# Patient Record
Sex: FEMALE | Race: WHITE | NOT HISPANIC OR LATINO | Employment: FULL TIME | URBAN - METROPOLITAN AREA
[De-identification: names, ages, dates, MRNs, and addresses within clinical notes are randomized per-mention and may not be internally consistent; named-entity substitution may affect disease eponyms.]

---

## 2023-05-10 ENCOUNTER — TELEPHONE (OUTPATIENT)
Dept: PRIMARY CARE | Facility: CLINIC | Age: 43
End: 2023-05-10

## 2023-05-12 ENCOUNTER — OFFICE VISIT (OUTPATIENT)
Dept: PRIMARY CARE | Facility: CLINIC | Age: 43
End: 2023-05-12
Payer: COMMERCIAL

## 2023-05-12 VITALS
OXYGEN SATURATION: 99 % | TEMPERATURE: 96.6 F | BODY MASS INDEX: 24.53 KG/M2 | WEIGHT: 152 LBS | DIASTOLIC BLOOD PRESSURE: 72 MMHG | SYSTOLIC BLOOD PRESSURE: 120 MMHG | HEART RATE: 100 BPM

## 2023-05-12 DIAGNOSIS — J20.9 ACUTE BRONCHITIS, UNSPECIFIED: ICD-10-CM

## 2023-05-12 DIAGNOSIS — J45.20 MILD INTERMITTENT REACTIVE AIRWAY DISEASE WITHOUT COMPLICATION (HHS-HCC): Primary | ICD-10-CM

## 2023-05-12 PROBLEM — K59.09 CHRONIC CONSTIPATION: Status: ACTIVE | Noted: 2023-05-12

## 2023-05-12 PROBLEM — M25.519 SHOULDER PAIN: Status: ACTIVE | Noted: 2023-05-12

## 2023-05-12 PROBLEM — B37.31 VULVOVAGINAL CANDIDIASIS: Status: ACTIVE | Noted: 2023-05-12

## 2023-05-12 PROBLEM — F32.A DEPRESSION: Status: ACTIVE | Noted: 2023-05-12

## 2023-05-12 PROBLEM — R76.8 POSITIVE ANA (ANTINUCLEAR ANTIBODY): Status: ACTIVE | Noted: 2023-05-12

## 2023-05-12 PROBLEM — I20.89 ATYPICAL ANGINA (CMS-HCC): Status: ACTIVE | Noted: 2023-05-12

## 2023-05-12 PROBLEM — M25.50 ARTHRALGIA: Status: ACTIVE | Noted: 2023-05-12

## 2023-05-12 PROBLEM — M25.839 ULNOCARPAL IMPINGEMENT SYNDROME: Status: ACTIVE | Noted: 2023-05-12

## 2023-05-12 PROBLEM — R19.7 DIARRHEA: Status: ACTIVE | Noted: 2023-05-12

## 2023-05-12 PROBLEM — R55 NEAR SYNCOPE: Status: ACTIVE | Noted: 2023-05-12

## 2023-05-12 PROBLEM — R11.0 NAUSEA IN ADULT: Status: ACTIVE | Noted: 2023-05-12

## 2023-05-12 PROBLEM — F41.9 ANXIETY: Status: ACTIVE | Noted: 2023-05-12

## 2023-05-12 PROBLEM — M25.531 RIGHT WRIST PAIN: Status: ACTIVE | Noted: 2023-05-12

## 2023-05-12 PROBLEM — J30.9 ALLERGIC RHINITIS: Status: ACTIVE | Noted: 2023-05-12

## 2023-05-12 PROBLEM — I73.00 RAYNAUD PHENOMENON: Status: ACTIVE | Noted: 2023-05-12

## 2023-05-12 PROBLEM — I95.1 ORTHOSTATIC HYPOTENSION: Status: ACTIVE | Noted: 2023-05-12

## 2023-05-12 PROBLEM — S63.599A TRIANGULAR FIBROCARTILAGE COMPLEX TEAR: Status: ACTIVE | Noted: 2023-05-12

## 2023-05-12 PROBLEM — M65.9 FCU (FLEXOR CARPI ULNARIS) TENOSYNOVITIS: Status: ACTIVE | Noted: 2023-05-12

## 2023-05-12 PROBLEM — J45.909 RAD (REACTIVE AIRWAY DISEASE) (HHS-HCC): Status: ACTIVE | Noted: 2023-05-12

## 2023-05-12 PROBLEM — M79.7 FIBROMYALGIA: Status: ACTIVE | Noted: 2023-05-12

## 2023-05-12 PROBLEM — M65.939 FCU (FLEXOR CARPI ULNARIS) TENOSYNOVITIS: Status: ACTIVE | Noted: 2023-05-12

## 2023-05-12 PROBLEM — U07.1 COVID-19: Status: ACTIVE | Noted: 2023-05-12

## 2023-05-12 PROBLEM — R30.0 DYSURIA: Status: ACTIVE | Noted: 2023-05-12

## 2023-05-12 PROBLEM — Z20.822 EXPOSURE TO COVID-19 VIRUS: Status: ACTIVE | Noted: 2023-05-12

## 2023-05-12 PROBLEM — R05.9 COUGH: Status: ACTIVE | Noted: 2023-05-12

## 2023-05-12 PROBLEM — Z98.890 S/P ARTHROSCOPY OF RIGHT SHOULDER: Status: ACTIVE | Noted: 2023-05-12

## 2023-05-12 PROBLEM — Z97.5 FAMILY PLANNING, IUD (INTRAUTERINE DEVICE) IN PLACE: Status: ACTIVE | Noted: 2023-05-12

## 2023-05-12 PROCEDURE — 1036F TOBACCO NON-USER: CPT | Performed by: INTERNAL MEDICINE

## 2023-05-12 PROCEDURE — 99214 OFFICE O/P EST MOD 30 MIN: CPT | Performed by: INTERNAL MEDICINE

## 2023-05-12 RX ORDER — ACETAMINOPHEN 500 MG
1 TABLET ORAL DAILY
COMMUNITY
End: 2023-05-12 | Stop reason: ALTCHOICE

## 2023-05-12 RX ORDER — SUMATRIPTAN 50 MG/1
50 TABLET, FILM COATED ORAL ONCE AS NEEDED
COMMUNITY
Start: 2019-08-29

## 2023-05-12 RX ORDER — ALBUTEROL SULFATE 0.83 MG/ML
2.5 SOLUTION RESPIRATORY (INHALATION) 4 TIMES DAILY PRN
Qty: 75 ML | Refills: 2 | Status: SHIPPED | OUTPATIENT
Start: 2023-05-12 | End: 2024-05-11

## 2023-05-12 RX ORDER — OMEPRAZOLE 20 MG/1
1 TABLET, DELAYED RELEASE ORAL DAILY
COMMUNITY

## 2023-05-12 RX ORDER — PLANT STANOL ESTER 450 MG
TABLET ORAL
COMMUNITY

## 2023-05-12 RX ORDER — ACETAMINOPHEN 500 MG
5000 TABLET ORAL
COMMUNITY
End: 2023-05-12 | Stop reason: ALTCHOICE

## 2023-05-12 RX ORDER — PROMETHAZINE HYDROCHLORIDE 25 MG/1
1 TABLET ORAL EVERY 6 HOURS PRN
COMMUNITY
Start: 2019-07-11 | End: 2023-07-31 | Stop reason: SDUPTHER

## 2023-05-12 RX ORDER — PREDNISONE 20 MG/1
TABLET ORAL
Qty: 18 TABLET | Refills: 0 | Status: SHIPPED | OUTPATIENT
Start: 2023-05-12 | End: 2023-05-21

## 2023-05-12 RX ORDER — CYCLOBENZAPRINE HCL 10 MG
10 TABLET ORAL 2 TIMES DAILY PRN
COMMUNITY
Start: 2021-02-10

## 2023-05-12 RX ORDER — FLUTICASONE PROPIONATE 50 MCG
2 SPRAY, SUSPENSION (ML) NASAL DAILY
COMMUNITY
Start: 2019-05-01

## 2023-05-12 RX ORDER — ALBUTEROL SULFATE 90 UG/1
2 AEROSOL, METERED RESPIRATORY (INHALATION) EVERY 4 HOURS PRN
COMMUNITY
Start: 2019-09-11

## 2023-05-12 RX ORDER — ALPRAZOLAM 0.5 MG/1
0.5 TABLET ORAL
COMMUNITY
Start: 2021-11-24

## 2023-05-12 RX ORDER — LEVONORGESTREL 52 MG/1
INTRAUTERINE DEVICE INTRAUTERINE
COMMUNITY
Start: 2021-11-24

## 2023-05-12 RX ORDER — CALCIUM CARBONATE 300MG(750)
TABLET,CHEWABLE ORAL
COMMUNITY
Start: 2021-02-10

## 2023-05-12 ASSESSMENT — LIFESTYLE VARIABLES: HOW OFTEN DO YOU HAVE A DRINK CONTAINING ALCOHOL: MONTHLY OR LESS

## 2023-05-12 NOTE — PROGRESS NOTES
Subjective    Stephon Smith is a 42 y.o. female who presents for Cough.  HPI    C/o cough, sob x 2 weeks  Having asthma attacks daily   Used nebulizer a couple times, helped better than albuterol inhaler     Needs albuterol solution     Went to urgent care on Sunday, prescribed zpak but told not to take unless she gets a fever.   Completed prednisone yesterday    Review of Systems   All other systems reviewed and are negative.        Objective     /72 (BP Location: Right arm, Patient Position: Sitting, BP Cuff Size: Adult)   Pulse 100   Temp 35.9 °C (96.6 °F)   Wt 68.9 kg (152 lb)   SpO2 99%   BMI 24.53 kg/m²    Physical Exam  Vitals reviewed.   Constitutional:       General: She is not in acute distress.     Appearance: Normal appearance.   Cardiovascular:      Rate and Rhythm: Normal rate and regular rhythm.      Pulses: Normal pulses.      Heart sounds: Normal heart sounds.   Pulmonary:      Effort: Pulmonary effort is normal.      Breath sounds: Normal breath sounds.      Comments: Rare wheeze  Abdominal:      Tenderness: There is no abdominal tenderness.   Musculoskeletal:         General: No swelling.   Skin:     General: Skin is warm and dry.   Neurological:      Mental Status: She is alert.       Health Maintenance Due   Topic Date Due    Yearly Adult Physical  Never done    Lipid Panel  Never done    HIV Screening  Never done    COVID-19 Vaccine (1) Never done    Pneumococcal Vaccine: Pediatrics (0 to 5 Years) and At-Risk Patients (6 to 64 Years) (1 - PCV) Never done    Varicella Vaccines (1 of 2 - 2-dose childhood series) Never done    Hepatitis C Screening  Never done    Diabetes Screening  Never done    Cervical Cancer Screening  Never done          Assessment/Plan   Problem List Items Addressed This Visit          Respiratory    RAD (reactive airway disease) - Primary    Relevant Medications    albuterol 2.5 mg /3 mL (0.083 %) nebulizer solution    predniSONE (Deltasone) 20 mg tablet     Other Relevant Orders    XR chest 2 views   Start the azithromyacin when she gets home. She has it at home.  Increase fluids and rest. Call if sx worse or not improving. Follow up as needed  Check cxr.

## 2023-07-31 ENCOUNTER — OFFICE VISIT (OUTPATIENT)
Dept: PRIMARY CARE | Facility: CLINIC | Age: 43
End: 2023-07-31
Payer: COMMERCIAL

## 2023-07-31 VITALS
DIASTOLIC BLOOD PRESSURE: 68 MMHG | HEART RATE: 100 BPM | RESPIRATION RATE: 14 BRPM | BODY MASS INDEX: 23.86 KG/M2 | OXYGEN SATURATION: 99 % | TEMPERATURE: 97.7 F | HEIGHT: 67 IN | SYSTOLIC BLOOD PRESSURE: 120 MMHG | WEIGHT: 152 LBS

## 2023-07-31 DIAGNOSIS — R10.10 PAIN OF UPPER ABDOMEN: Primary | ICD-10-CM

## 2023-07-31 DIAGNOSIS — R11.0 NAUSEA IN ADULT: ICD-10-CM

## 2023-07-31 LAB — PREGNANCY TEST URINE, POC: NEGATIVE

## 2023-07-31 PROCEDURE — 81025 URINE PREGNANCY TEST: CPT | Performed by: INTERNAL MEDICINE

## 2023-07-31 PROCEDURE — 99214 OFFICE O/P EST MOD 30 MIN: CPT | Performed by: INTERNAL MEDICINE

## 2023-07-31 PROCEDURE — 1036F TOBACCO NON-USER: CPT | Performed by: INTERNAL MEDICINE

## 2023-07-31 RX ORDER — AMOXICILLIN AND CLAVULANATE POTASSIUM 875; 125 MG/1; MG/1
1 TABLET, FILM COATED ORAL
Qty: 20 TABLET | Status: CANCELLED
Start: 2023-07-31 | End: 2023-08-10

## 2023-07-31 RX ORDER — PROMETHAZINE HYDROCHLORIDE 25 MG/1
25 TABLET ORAL EVERY 6 HOURS PRN
Qty: 30 TABLET | Refills: 3 | Status: SHIPPED | OUTPATIENT
Start: 2023-07-31

## 2023-07-31 NOTE — PROGRESS NOTES
"Subjective    Stephon Smith is a 43 y.o. female who presents for Nausea.  HPI  C/o abd pain/cramping, bloating, nausea/vomiting, diarrhea. Intermittent over the last 3 weeks.   Hands feel clammy, headaches   For past three weeks. She had nausea, dairrhea, vomiting.  Then it resolved. Then restarted.   It does not matter if she eats  or not   She has had diarrhea  three times.   She has abd pain and bloating.   No recent abx.      Review of Systems   All other systems reviewed and are negative.        Objective     /68 (BP Location: Left arm, Patient Position: Sitting, BP Cuff Size: Adult)   Pulse 100   Temp 36.5 °C (97.7 °F) (Skin)   Resp 14   Ht 1.702 m (5' 7\")   Wt 68.9 kg (152 lb)   SpO2 99%   BMI 23.81 kg/m²    Physical Exam  Vitals reviewed.   Constitutional:       General: She is not in acute distress.     Appearance: Normal appearance.   Cardiovascular:      Rate and Rhythm: Normal rate and regular rhythm.      Pulses: Normal pulses.      Heart sounds: Normal heart sounds.   Pulmonary:      Effort: Pulmonary effort is normal.      Breath sounds: Normal breath sounds.   Abdominal:      General: Abdomen is flat. Bowel sounds are normal.      Palpations: Abdomen is soft.      Tenderness: There is abdominal tenderness.      Comments: Mild epigastric tenderness   Musculoskeletal:         General: No swelling.   Skin:     General: Skin is warm and dry.   Neurological:      Mental Status: She is alert.       Health Maintenance Due   Topic Date Due    Yearly Adult Physical  Never done    Lipid Panel  Never done    HIV Screening  Never done    COVID-19 Vaccine (1) Never done    Pneumococcal Vaccine: Pediatrics (0 to 5 Years) and At-Risk Patients (6 to 64 Years) (1 - PCV) Never done    Varicella Vaccines (1 of 2 - 2-dose childhood series) Never done    Hepatitis C Screening  Never done    Diabetes Screening  Never done    Cervical Cancer Screening  Never done          Assessment/Plan   Problem List " Items Addressed This Visit       Nausea in adult    Relevant Medications    promethazine (Phenergan) 25 mg tablet    Other Relevant Orders    POCT pregnancy, urine manually resulted (Completed)     Other Visit Diagnoses       Pain of upper abdomen    -  Primary    Relevant Orders    CBC    Comprehensive Metabolic Panel    Lipase    US gallbladder        Check labs, us  Increase her omeprazole to 40 a day  Watch diet   If us is neg will check hida. If sx worsen refer  to gi

## 2023-08-04 ENCOUNTER — LAB (OUTPATIENT)
Dept: LAB | Facility: LAB | Age: 43
End: 2023-08-04
Payer: COMMERCIAL

## 2023-08-04 DIAGNOSIS — R10.10 PAIN OF UPPER ABDOMEN: ICD-10-CM

## 2023-08-04 LAB
ERYTHROCYTE DISTRIBUTION WIDTH (RATIO) BY AUTOMATED COUNT: 12.3 % (ref 11.5–14.5)
ERYTHROCYTE MEAN CORPUSCULAR HEMOGLOBIN CONCENTRATION (G/DL) BY AUTOMATED: 32.9 G/DL (ref 32–36)
ERYTHROCYTE MEAN CORPUSCULAR VOLUME (FL) BY AUTOMATED COUNT: 90 FL (ref 80–100)
ERYTHROCYTES (10*6/UL) IN BLOOD BY AUTOMATED COUNT: 4.62 X10E12/L (ref 4–5.2)
HEMATOCRIT (%) IN BLOOD BY AUTOMATED COUNT: 41.4 % (ref 36–46)
HEMOGLOBIN (G/DL) IN BLOOD: 13.6 G/DL (ref 12–16)
LEUKOCYTES (10*3/UL) IN BLOOD BY AUTOMATED COUNT: 6.4 X10E9/L (ref 4.4–11.3)
PLATELETS (10*3/UL) IN BLOOD AUTOMATED COUNT: 287 X10E9/L (ref 150–450)

## 2023-08-04 PROCEDURE — 36415 COLL VENOUS BLD VENIPUNCTURE: CPT

## 2023-08-04 PROCEDURE — 83690 ASSAY OF LIPASE: CPT

## 2023-08-04 PROCEDURE — 80053 COMPREHEN METABOLIC PANEL: CPT

## 2023-08-04 PROCEDURE — 85027 COMPLETE CBC AUTOMATED: CPT

## 2023-08-05 LAB
ALANINE AMINOTRANSFERASE (SGPT) (U/L) IN SER/PLAS: 16 U/L (ref 7–45)
ALBUMIN (G/DL) IN SER/PLAS: 4.5 G/DL (ref 3.4–5)
ALKALINE PHOSPHATASE (U/L) IN SER/PLAS: 41 U/L (ref 33–110)
ANION GAP IN SER/PLAS: 12 MMOL/L (ref 10–20)
ASPARTATE AMINOTRANSFERASE (SGOT) (U/L) IN SER/PLAS: 15 U/L (ref 9–39)
BILIRUBIN TOTAL (MG/DL) IN SER/PLAS: 0.8 MG/DL (ref 0–1.2)
CALCIUM (MG/DL) IN SER/PLAS: 9.4 MG/DL (ref 8.6–10.3)
CARBON DIOXIDE, TOTAL (MMOL/L) IN SER/PLAS: 28 MMOL/L (ref 21–32)
CHLORIDE (MMOL/L) IN SER/PLAS: 105 MMOL/L (ref 98–107)
CREATININE (MG/DL) IN SER/PLAS: 0.77 MG/DL (ref 0.5–1.05)
GFR FEMALE: >90 ML/MIN/1.73M2
GLUCOSE (MG/DL) IN SER/PLAS: 73 MG/DL (ref 74–99)
LIPASE (U/L) IN SER/PLAS: 48 U/L (ref 9–82)
POTASSIUM (MMOL/L) IN SER/PLAS: 4.3 MMOL/L (ref 3.5–5.3)
PROTEIN TOTAL: 6.4 G/DL (ref 6.4–8.2)
SODIUM (MMOL/L) IN SER/PLAS: 141 MMOL/L (ref 136–145)
UREA NITROGEN (MG/DL) IN SER/PLAS: 15 MG/DL (ref 6–23)

## 2023-08-08 DIAGNOSIS — R10.10 PAIN OF UPPER ABDOMEN: Primary | ICD-10-CM

## 2023-08-08 DIAGNOSIS — R11.0 NAUSEA IN ADULT: ICD-10-CM

## 2023-08-08 NOTE — RESULT ENCOUNTER NOTE
Her us is negative. Small cysts in liver.   I will order a hida with gb ef please help her set  it up

## 2023-08-09 ENCOUNTER — TELEPHONE (OUTPATIENT)
Dept: PRIMARY CARE | Facility: CLINIC | Age: 43
End: 2023-08-09
Payer: COMMERCIAL

## 2023-09-07 ENCOUNTER — TELEPHONE (OUTPATIENT)
Dept: PRIMARY CARE | Facility: CLINIC | Age: 43
End: 2023-09-07
Payer: COMMERCIAL

## 2023-09-07 DIAGNOSIS — R10.10 PAIN OF UPPER ABDOMEN: ICD-10-CM

## 2023-09-07 DIAGNOSIS — R11.0 NAUSEA IN ADULT: ICD-10-CM

## 2023-09-07 NOTE — TELEPHONE ENCOUNTER
Marga Johnson, DO  Fatma Olvera, CMA  Her hida is  negative/normal function  Would recommend she see gastro. Please put a referral in and help her set  itup

## 2023-09-07 NOTE — RESULT ENCOUNTER NOTE
Her hida is  negative/normal function  Would recommend she see gastro. Please put a referral in and help her set  itup

## 2023-10-15 NOTE — PROGRESS NOTES
History of Present Illness:   Stephon Smith is a 43 y.o. female with a PMH of anxiety who presents to clinic for abdominal pain, nausea, and altered Bms (mostly constipation).  Patient states that her biggest complaint is constipation and nausea.  Constipation has been most of her life.  Although, she did have diarrhea for about a month, last month.  She has tried MiraLAX and Metamucil in the past.  Shredded wheat or raisin bran seem to be the only thing that helps.  She will have a bowel movement almost daily.  However, she can go multiple days without a bowel movement.  They are almost always small rocklike in consistency.  The abdominal is throughout the abdomen.  It is worse in the epigastric area.  + Bloating.  Her PCP has tried Phenergan and PPI for her symptoms.  PPI has not really helped.  She eats a healthy diet.  She does not consume pop.  No tobacco use.  She does admit to significant life stressors.  She has issues with anxiety.  Colonoscopy ~4 years ago - WNL.     RUQ US showed small liver cysts. Normal Hida scan. Normal LFTs.     Review of Systems  ROS Negative unless otherwise stated above.    Past Medical/Surgical History  Past Medical History:   Diagnosis Date    Superior glenoid labrum lesion of unspecified shoulder, initial encounter     Superior labrum anterior-to-posterior (SLAP) tear of shoulder    Unspecified lump in the right breast, unspecified quadrant 12/03/2013    Mass of right breast      Past Surgical History:   Procedure Laterality Date    DILATION AND CURETTAGE OF UTERUS  12/03/2013    Dilation And Curettage        Social History   reports that she has never smoked. She has never used smokeless tobacco. She reports current alcohol use. She reports that she does not use drugs.     Family History  family history is not on file.     Allergies  Allergies   Allergen Reactions    Azithromycin Hives     itchy pimples on chest and back after finishing course of abx    Bupropion Hives     Cefdinir Hives     dizziness    Erythromycin Hives     itchy pimples on chest and back after finishing course of abx    Topiramate Other     confusion/altered mental state    Doxycycline Hives and Rash    Latex Hives, Itching and Rash    Minocycline Hives and Rash    Penicillins Hives and Rash     itchy pimples on chest and back after finishing course of abx       Medications  Current Outpatient Medications   Medication Instructions    albuterol 90 mcg/actuation inhaler 2 puffs, inhalation, Every 4 hours PRN    albuterol 2.5 mg, nebulization, 4 times daily PRN    ALPRAZolam (XANAX) 0.5 mg, oral    cyclobenzaprine (FLEXERIL) 10 mg, oral, 2 times daily PRN    fluticasone (Flonase) 50 mcg/actuation nasal spray 2 sprays, nasal, Daily    levonorgestrel (Mirena) 21 mcg/24 hours (8 yrs) 52 mg IUD intrauterine    magnesium oxide (Mag-Ox) 400 mg tablet oral    omeprazole OTC (PriLOSEC OTC) 20 mg EC tablet 1 tablet, oral, Daily    potassium gluconate 550 mg (90 mg) tablet oral    promethazine (PHENERGAN) 25 mg, oral, Every 6 hours PRN    SUMAtriptan (IMITREX) 50 mg, oral, Once as needed        Objective   Visit Vitals  /83   Pulse 106   Resp 18        General: A&Ox3, NAD.  HEENT: AT/NC.   CV: Tachycardic.   Resp: CTA bilaterally. No wheezing, rhonchi or rales.   GI: Soft, NT/ND.   Extrem: No edema. Pulses intact.  Skin: No Jaundice.   Neuro: No focal deficits.   Psych: Normal mood and affect.     Assessment/Plan   Stephon Smith is a 43 y.o. female with a PMH of FM, depression/anxiety who presents to clinic for abdominal pain, nausea, and altered BMs (mostly constipation).    Suspect patient's symptoms are functional/IBS vs chronic constipation vs other.  As the constipation seems to be the bigger issue and can definitely lead to bloating, nausea, etc., we will focus on that and start with a bowel prep clean out and Linzess 290 mcg QD.  MiraLAX and Metamucil did not work in the past (although these were not titrated).  RUQ US showed small liver cysts. Normal Hida scan. Normal LFTs. Patient must reduce stress/anxiety. Advised to exercise/walk daily, hobbies, yoga, etc. Consider endoscopic evaluation. Consider TCA. Would hold off on antiemetics, as they will likely worsen constipation.       Jerson Agarwal, DO

## 2023-10-16 ENCOUNTER — OFFICE VISIT (OUTPATIENT)
Dept: GASTROENTEROLOGY | Facility: CLINIC | Age: 43
End: 2023-10-16
Payer: COMMERCIAL

## 2023-10-16 ENCOUNTER — LAB (OUTPATIENT)
Dept: LAB | Facility: LAB | Age: 43
End: 2023-10-16
Payer: COMMERCIAL

## 2023-10-16 VITALS
HEIGHT: 66 IN | HEART RATE: 106 BPM | OXYGEN SATURATION: 98 % | BODY MASS INDEX: 25.07 KG/M2 | WEIGHT: 156 LBS | DIASTOLIC BLOOD PRESSURE: 83 MMHG | RESPIRATION RATE: 18 BRPM | SYSTOLIC BLOOD PRESSURE: 122 MMHG

## 2023-10-16 DIAGNOSIS — K58.1 IRRITABLE BOWEL SYNDROME WITH CONSTIPATION: Primary | ICD-10-CM

## 2023-10-16 DIAGNOSIS — R10.10 PAIN OF UPPER ABDOMEN: ICD-10-CM

## 2023-10-16 DIAGNOSIS — R11.0 NAUSEA IN ADULT: ICD-10-CM

## 2023-10-16 DIAGNOSIS — K59.09 CHRONIC CONSTIPATION: ICD-10-CM

## 2023-10-16 LAB — CRP SERPL-MCNC: <0.1 MG/DL

## 2023-10-16 PROCEDURE — 1036F TOBACCO NON-USER: CPT | Performed by: STUDENT IN AN ORGANIZED HEALTH CARE EDUCATION/TRAINING PROGRAM

## 2023-10-16 PROCEDURE — 83516 IMMUNOASSAY NONANTIBODY: CPT

## 2023-10-16 PROCEDURE — 82784 ASSAY IGA/IGD/IGG/IGM EACH: CPT

## 2023-10-16 PROCEDURE — 36415 COLL VENOUS BLD VENIPUNCTURE: CPT

## 2023-10-16 PROCEDURE — 99204 OFFICE O/P NEW MOD 45 MIN: CPT | Performed by: STUDENT IN AN ORGANIZED HEALTH CARE EDUCATION/TRAINING PROGRAM

## 2023-10-16 PROCEDURE — 86140 C-REACTIVE PROTEIN: CPT

## 2023-10-17 LAB
IGA SERPL-MCNC: 171 MG/DL (ref 70–400)
TTG IGA SER IA-ACNC: <1 U/ML

## 2023-10-19 ENCOUNTER — OFFICE VISIT (OUTPATIENT)
Dept: PRIMARY CARE | Facility: CLINIC | Age: 43
End: 2023-10-19
Payer: COMMERCIAL

## 2023-10-19 VITALS
TEMPERATURE: 98 F | WEIGHT: 157 LBS | HEART RATE: 94 BPM | BODY MASS INDEX: 25.34 KG/M2 | DIASTOLIC BLOOD PRESSURE: 86 MMHG | RESPIRATION RATE: 16 BRPM | SYSTOLIC BLOOD PRESSURE: 120 MMHG

## 2023-10-19 DIAGNOSIS — B35.4 RINGWORM, BODY: Primary | ICD-10-CM

## 2023-10-19 PROCEDURE — 1036F TOBACCO NON-USER: CPT | Performed by: NURSE PRACTITIONER

## 2023-10-19 PROCEDURE — 99213 OFFICE O/P EST LOW 20 MIN: CPT | Performed by: NURSE PRACTITIONER

## 2023-10-19 RX ORDER — CLOTRIMAZOLE AND BETAMETHASONE DIPROPIONATE 10; .64 MG/G; MG/G
1 CREAM TOPICAL 2 TIMES DAILY
Qty: 45 G | Refills: 1 | Status: SHIPPED | OUTPATIENT
Start: 2023-10-19 | End: 2023-12-18

## 2023-10-19 RX ORDER — METHYLPREDNISOLONE 4 MG/1
TABLET ORAL
Qty: 21 TABLET | Refills: 0 | Status: SHIPPED | OUTPATIENT
Start: 2023-10-19 | End: 2023-10-26

## 2023-10-19 ASSESSMENT — ENCOUNTER SYMPTOMS
FEVER: 0
EYE PAIN: 0
VOMITING: 0
SORE THROAT: 0
DIARRHEA: 0
FATIGUE: 0
SHORTNESS OF BREATH: 0
COUGH: 0

## 2023-10-19 NOTE — PROGRESS NOTES
Subjective   Patient ID: Stephon Smith is a 43 y.o. female who presents for Rash. She had a rash for 2 weeks, treating it like ringworm with lamisil and fungal ointment. The rash has spread over her breast and all over her chest. It is painful at times. Her dog had a similar rash, but it went away.    Rash  This is a new problem. The current episode started 1 to 4 weeks ago. The problem is unchanged. The affected locations include the chest, abdomen and torso. The rash is characterized by itchiness, redness and swelling. She was exposed to nothing. Pertinent negatives include no congestion, cough, diarrhea, eye pain, facial edema, fatigue, fever, shortness of breath, sore throat or vomiting. Treatments tried: Lamisil. The treatment provided no relief.        Review of Systems   Constitutional:  Negative for fatigue and fever.   HENT:  Negative for congestion and sore throat.    Eyes:  Negative for pain.   Respiratory:  Negative for cough and shortness of breath.    Gastrointestinal:  Negative for diarrhea and vomiting.   Skin:  Positive for rash.       Objective   /86 (BP Location: Left arm, Patient Position: Sitting, BP Cuff Size: Adult)   Pulse 94   Temp 36.7 °C (98 °F) (Temporal)   Resp 16   Wt 71.2 kg (157 lb)   BMI 25.34 kg/m²     Physical Exam  Constitutional:       Appearance: Normal appearance.   Cardiovascular:      Rate and Rhythm: Normal rate and regular rhythm.      Pulses: Normal pulses.      Heart sounds: Normal heart sounds.   Pulmonary:      Effort: Pulmonary effort is normal.      Breath sounds: Normal breath sounds.   Abdominal:      General: Bowel sounds are normal.      Palpations: Abdomen is soft.   Musculoskeletal:         General: Normal range of motion.   Skin:     General: Skin is warm.      Findings: Rash present. Rash is scaling (patches on chest, abdomen, and back).   Neurological:      Mental Status: She is alert.   Psychiatric:         Mood and Affect: Mood normal.          Behavior: Behavior normal.         Assessment/Plan   Problem List Items Addressed This Visit    None  Visit Diagnoses       Ringworm, body    -  Primary    Relevant Medications    clotrimazole-betamethasone (Lotrisone) cream    methylPREDNISolone (Medrol Dospak) 4 mg tablets          Patient Instructions   Patient to start taking medrol dose wilfrido and topical cream as ordered. Call the office if no improvement by Monday. Follow-up with PCP in 1-2 weeks as needed.

## 2023-10-23 NOTE — PATIENT INSTRUCTIONS
Patient to start taking medrol dose wilfrido and topical cream as ordered. Call the office if no improvement by Monday. Follow-up with PCP in 1-2 weeks as needed.

## 2023-11-17 ENCOUNTER — OFFICE VISIT (OUTPATIENT)
Dept: PRIMARY CARE | Facility: CLINIC | Age: 43
End: 2023-11-17
Payer: COMMERCIAL

## 2023-11-17 VITALS
WEIGHT: 161 LBS | OXYGEN SATURATION: 98 % | HEART RATE: 73 BPM | RESPIRATION RATE: 18 BRPM | TEMPERATURE: 98.4 F | DIASTOLIC BLOOD PRESSURE: 80 MMHG | SYSTOLIC BLOOD PRESSURE: 126 MMHG | BODY MASS INDEX: 25.99 KG/M2

## 2023-11-17 DIAGNOSIS — J06.9 UPPER RESPIRATORY TRACT INFECTION, UNSPECIFIED TYPE: Primary | ICD-10-CM

## 2023-11-17 DIAGNOSIS — Z86.19 FREQUENT INFECTIONS: ICD-10-CM

## 2023-11-17 PROCEDURE — 99214 OFFICE O/P EST MOD 30 MIN: CPT | Performed by: NURSE PRACTITIONER

## 2023-11-17 PROCEDURE — 87636 SARSCOV2 & INF A&B AMP PRB: CPT

## 2023-11-17 PROCEDURE — 1036F TOBACCO NON-USER: CPT | Performed by: NURSE PRACTITIONER

## 2023-11-17 RX ORDER — AZITHROMYCIN 250 MG/1
TABLET, FILM COATED ORAL
Qty: 6 TABLET | Refills: 0 | Status: SHIPPED | OUTPATIENT
Start: 2023-11-17 | End: 2023-11-22

## 2023-11-17 RX ORDER — PREDNISONE 20 MG/1
TABLET ORAL
Qty: 18 TABLET | Refills: 0 | Status: SHIPPED | OUTPATIENT
Start: 2023-11-17

## 2023-11-17 ASSESSMENT — ENCOUNTER SYMPTOMS
DIARRHEA: 1
FATIGUE: 1
HEADACHES: 1
SORE THROAT: 1
WHEEZING: 1
NAUSEA: 0
ABDOMINAL PAIN: 0
CHILLS: 1
MYALGIAS: 1
VOMITING: 0
COUGH: 1
SHORTNESS OF BREATH: 1
SINUS PAIN: 1
APPETITE CHANGE: 1
FEVER: 1
SINUS PRESSURE: 1
RHINORRHEA: 1

## 2023-11-17 NOTE — PROGRESS NOTES
Patient says that for the past three weeks she has had on and off sinus congestion, headaches, cough. Pt has been coughing up yellow/green drainage. Pt has had on and off fevers, muscle aches and chills. Pt has left ear pain. Patient is not vaccinated against COVID. She says that she tested for COVID on Wednesday and it was negative. Patient has been using OTC sudafed, mucous relief and advil. Pt has been using nebulizer. Her lungs hurt. She says that her lungs burn.      Review of Systems   Constitutional:  Positive for appetite change, chills, fatigue and fever (last one Tuesday, felt warm 99.8 after advil).   HENT:  Positive for congestion, ear pain, rhinorrhea, sinus pressure, sinus pain, sneezing and sore throat.    Respiratory:  Positive for cough, shortness of breath (tuesday and wednesday) and wheezing (tuesday and wednesday).    Gastrointestinal:  Positive for diarrhea. Negative for abdominal pain, nausea and vomiting.   Musculoskeletal:  Positive for myalgias.   Neurological:  Positive for headaches.     Objective   /80   Pulse 73   Temp 36.9 °C (98.4 °F)   Resp 18   Wt 73 kg (161 lb)   SpO2 98%   BMI 25.99 kg/m²     Physical Exam  Vitals reviewed.   Constitutional:       General: She is not in acute distress.     Appearance: Normal appearance. She is not ill-appearing or toxic-appearing.   HENT:      Head: Atraumatic.      Right Ear: Tympanic membrane, ear canal and external ear normal.      Left Ear: Tympanic membrane, ear canal and external ear normal.      Nose: Congestion and rhinorrhea present.      Right Sinus: Maxillary sinus tenderness and frontal sinus tenderness present.      Left Sinus: Maxillary sinus tenderness and frontal sinus tenderness present.      Mouth/Throat:      Mouth: Mucous membranes are moist.      Pharynx: Oropharynx is clear. No oropharyngeal exudate or posterior oropharyngeal erythema.   Eyes:      Conjunctiva/sclera: Conjunctivae normal.   Cardiovascular:       Rate and Rhythm: Normal rate and regular rhythm.      Heart sounds: Normal heart sounds. No murmur heard.  Pulmonary:      Effort: Pulmonary effort is normal.      Breath sounds: Normal breath sounds. No wheezing.   Skin:     General: Skin is warm and dry.   Neurological:      General: No focal deficit present.      Mental Status: She is alert.     Assessment/Plan   Problem List Items Addressed This Visit    None  Visit Diagnoses         Codes    Upper respiratory tract infection, unspecified type    -  Primary J06.9    Relevant Medications    azithromycin (Zithromax) 250 mg tablet    predniSONE (Deltasone) 20 mg tablet    Other Relevant Orders    Sars-CoV-2 and Influenza A/B PCR    XR chest 2 views    Frequent infections     Z86.19    Relevant Orders    Referral to Allergy        Will get COVID testing. Patient with ongoing URI symptoms. She has several allergies listed but she has tolerated azithromycin in the past. Will start pt on prednisone taper. Reminded pt to avoid other anti-inflammatories while on the steroid; she agreed. Pt has had ongoing cough so chest xray ordered as well. Advised patient on use of humidifier and hot steam treatments. Discussed that patient is to drink plenty of fluids and stay well hydrated. Can take tylenol as needed for any fevers or discomfort. Patient can use mucinex and flonase as well. Discussed that patient is to go to the ER for any chest pain, difficulty breathing, shortness of breath or new/concerning symptoms; she agreed. Will call pt when results become available. Pt reminded to self quarantine; she agreed. Pt to follow up in 2-3 days if no better despite the use of the medications.     Of note, pt has had ongoing URI symptoms. Will refer to allergy/immunology for further workup. She had a workup through the [facility] several years ago but would like re-evaluation.

## 2023-11-18 LAB
FLUAV RNA RESP QL NAA+PROBE: NOT DETECTED
FLUBV RNA RESP QL NAA+PROBE: NOT DETECTED
SARS-COV-2 RNA RESP QL NAA+PROBE: DETECTED

## 2023-11-18 NOTE — RESULT ENCOUNTER NOTE
Spoke to patient this morning and relayed positive COVID test results. Patient says that last night she started taking the medicines and today she is feeling slightly better. She says that her most recent URI symptoms started on Tuesday with body aches. I discussed with her the use of antivirals for COVID and she says that she is likely close to being outside of the treatment window for COVId and she will stay on her current treatment. I advised that patient is to go to the ER for any difficulty breathing, shortness of breath, chest pain or new/concerning symptoms; she agreed. PT to follow up as needed.

## 2023-11-19 ENCOUNTER — TELEPHONE (OUTPATIENT)
Dept: PRIMARY CARE | Facility: CLINIC | Age: 43
End: 2023-11-19
Payer: COMMERCIAL

## 2023-11-19 NOTE — TELEPHONE ENCOUNTER
----- Message from DAYTON Solorzano sent at 11/18/2023 10:37 AM EST -----  Spoke to patient this morning and relayed positive COVID test results. Patient says that last night she started taking the medicines and today she is feeling slightly better. She says that her most recent URI symptoms started on Tuesday with body aches. I discussed with her the use of antivirals for COVID and she says that she is likely close to being outside of the treatment window for COVId and she will stay on her current treatment. I advised that patient is to go to the ER for any difficulty breathing, shortness of breath, chest pain or new/concerning symptoms; she agreed. PT to follow up as needed.

## 2023-12-11 ENCOUNTER — APPOINTMENT (OUTPATIENT)
Dept: GASTROENTEROLOGY | Facility: CLINIC | Age: 43
End: 2023-12-11
Payer: COMMERCIAL

## 2024-06-13 DIAGNOSIS — M79.7 FIBROMYALGIA: ICD-10-CM

## 2024-06-13 RX ORDER — MILNACIPRAN HYDROCHLORIDE 50 MG/1
TABLET, FILM COATED ORAL 2 TIMES DAILY
Qty: 180 TABLET | Refills: 3 | Status: SHIPPED | OUTPATIENT
Start: 2024-06-13

## 2024-07-09 ENCOUNTER — TELEPHONE (OUTPATIENT)
Dept: SCHEDULING | Age: 44
End: 2024-07-09

## 2024-07-09 ENCOUNTER — APPOINTMENT (OUTPATIENT)
Dept: PRIMARY CARE | Facility: CLINIC | Age: 44
End: 2024-07-09
Payer: COMMERCIAL

## 2024-07-09 VITALS
BODY MASS INDEX: 25.23 KG/M2 | OXYGEN SATURATION: 100 % | SYSTOLIC BLOOD PRESSURE: 120 MMHG | RESPIRATION RATE: 14 BRPM | WEIGHT: 157 LBS | HEIGHT: 66 IN | TEMPERATURE: 98.2 F | HEART RATE: 97 BPM | DIASTOLIC BLOOD PRESSURE: 88 MMHG

## 2024-07-09 DIAGNOSIS — Z12.31 ENCOUNTER FOR SCREENING MAMMOGRAM FOR MALIGNANT NEOPLASM OF BREAST: ICD-10-CM

## 2024-07-09 DIAGNOSIS — R92.2 DENSE BREAST: ICD-10-CM

## 2024-07-09 DIAGNOSIS — M79.7 FIBROMYALGIA: ICD-10-CM

## 2024-07-09 DIAGNOSIS — R92.30 DENSE BREAST: ICD-10-CM

## 2024-07-09 DIAGNOSIS — J45.20 MILD INTERMITTENT ASTHMA, UNSPECIFIED WHETHER COMPLICATED (HHS-HCC): ICD-10-CM

## 2024-07-09 DIAGNOSIS — Z00.00 WELLNESS EXAMINATION: Primary | ICD-10-CM

## 2024-07-09 DIAGNOSIS — E01.0 THYROMEGALY: ICD-10-CM

## 2024-07-09 DIAGNOSIS — F41.9 ANXIETY: ICD-10-CM

## 2024-07-09 PROBLEM — U07.1 COVID-19: Status: RESOLVED | Noted: 2023-05-12 | Resolved: 2024-07-09

## 2024-07-09 PROBLEM — R55 NEAR SYNCOPE: Status: RESOLVED | Noted: 2023-05-12 | Resolved: 2024-07-09

## 2024-07-09 PROBLEM — J20.9 BRONCHOSPASM WITH BRONCHITIS, ACUTE: Status: RESOLVED | Noted: 2023-05-12 | Resolved: 2024-07-09

## 2024-07-09 PROBLEM — R05.9 COUGH: Status: RESOLVED | Noted: 2023-05-12 | Resolved: 2024-07-09

## 2024-07-09 PROBLEM — Z98.890 S/P ARTHROSCOPY OF RIGHT SHOULDER: Status: RESOLVED | Noted: 2023-05-12 | Resolved: 2024-07-09

## 2024-07-09 PROBLEM — B37.31 VULVOVAGINAL CANDIDIASIS: Status: RESOLVED | Noted: 2023-05-12 | Resolved: 2024-07-09

## 2024-07-09 PROBLEM — Z20.822 EXPOSURE TO COVID-19 VIRUS: Status: RESOLVED | Noted: 2023-05-12 | Resolved: 2024-07-09

## 2024-07-09 PROBLEM — I20.89 ATYPICAL ANGINA (CMS-HCC): Status: RESOLVED | Noted: 2023-05-12 | Resolved: 2024-07-09

## 2024-07-09 PROBLEM — R30.0 DYSURIA: Status: RESOLVED | Noted: 2023-05-12 | Resolved: 2024-07-09

## 2024-07-09 PROBLEM — M25.531 RIGHT WRIST PAIN: Status: RESOLVED | Noted: 2023-05-12 | Resolved: 2024-07-09

## 2024-07-09 PROCEDURE — 1036F TOBACCO NON-USER: CPT | Performed by: INTERNAL MEDICINE

## 2024-07-09 PROCEDURE — 99396 PREV VISIT EST AGE 40-64: CPT | Performed by: INTERNAL MEDICINE

## 2024-07-09 RX ORDER — ALPRAZOLAM 0.5 MG/1
0.5 TABLET ORAL 3 TIMES DAILY PRN
Qty: 18 TABLET | Refills: 0 | Status: SHIPPED | OUTPATIENT
Start: 2024-07-09 | End: 2024-07-15

## 2024-07-09 ASSESSMENT — PATIENT HEALTH QUESTIONNAIRE - PHQ9
SUM OF ALL RESPONSES TO PHQ9 QUESTIONS 1 AND 2: 0
1. LITTLE INTEREST OR PLEASURE IN DOING THINGS: NOT AT ALL
2. FEELING DOWN, DEPRESSED OR HOPELESS: NOT AT ALL

## 2024-07-09 NOTE — PROGRESS NOTES
"Subjective    Stephon Smith is a 44 y.o. female who presents for Annual Exam.  HPI    GYN 2023 repeat in 2028  Mammogram 2022  Discuss breast MRI  Mom recently diagnosed with focal atypical ductal hyperplasia  breast carcinoma   Maternal great grandma and grandma both had breast cancer   She developed asthma after Covid. Uses albuterol. Only uses her albuterol rarely  Her fibro is bad.     Review of Systems   All other systems reviewed and are negative.        Objective     /88 (BP Location: Left arm, Patient Position: Sitting, BP Cuff Size: Adult)   Pulse 97   Temp 36.8 °C (98.2 °F) (Skin)   Resp 14   Ht 1.676 m (5' 6\")   Wt 71.2 kg (157 lb)   SpO2 100%   BMI 25.34 kg/m²    Physical Exam  Constitutional:       Appearance: Normal appearance.   Neck:      Thyroid: Thyromegaly present.   Cardiovascular:      Rate and Rhythm: Normal rate and regular rhythm.      Pulses: Normal pulses.   Pulmonary:      Effort: Pulmonary effort is normal.      Breath sounds: Normal breath sounds.   Chest:      Chest wall: No mass or tenderness.   Breasts:     Right: Normal.      Left: Normal.   Abdominal:      General: Abdomen is flat.   Musculoskeletal:      Cervical back: Neck supple. No tenderness.   Lymphadenopathy:      Cervical: No cervical adenopathy.      Upper Body:      Right upper body: No supraclavicular or axillary adenopathy.      Left upper body: No supraclavicular or axillary adenopathy.   Neurological:      Mental Status: She is alert.   Psychiatric:         Attention and Perception: Attention and perception normal.         Mood and Affect: Mood and affect normal.     Health Maintenance Due   Topic Date Due    Yearly Adult Physical  Never done    Lipid Panel  Never done    HIV Screening  Never done    COVID-19 Vaccine (1) Never done    Pneumococcal Vaccine: Pediatrics (0 to 5 Years) and At-Risk Patients (6 to 64 Years) (1 of 2 - PCV) Never done    Varicella Vaccines (1 of 2 - 13+ 2-dose series) Never " done    Hepatitis C Screening  Never done    Diabetes Screening  Never done    Hepatitis A Vaccines (1 of 2 - Risk 2-dose series) Never done    Mammogram  12/16/2023    DTaP/Tdap/Td Vaccines (3 - Td or Tdap) 01/03/2024          Assessment/Plan   Problem List Items Addressed This Visit       Anxiety    Relevant Medications    ALPRAZolam (Xanax) 0.5 mg tablet    Fibromyalgia    RAD (reactive airway disease) ([facility])     Other Visit Diagnoses       Wellness examination    -  Primary    Relevant Orders    CBC    Comprehensive Metabolic Panel    Lipid Panel    Encounter for screening mammogram for malignant neoplasm of breast        Relevant Orders    BI mammo bilateral screening tomosynthesis    Dense breast        Relevant Orders    MR breast bilateral w IV contrast fast screening self pay    Thyromegaly        Relevant Orders    US thyroid    Tsh With Reflex To Free T4 If Abnormal        Check labs.   Refill xanax.  Order mamm and mri of breast.  Her thyroid is enlarged. Check us  She would like to start lyrica. Will have her follow up for controlled substance appt.  Call  with any problems or questions.   Follow up as needed

## 2024-07-26 ENCOUNTER — HOSPITAL ENCOUNTER (OUTPATIENT)
Dept: RADIOLOGY | Facility: HOSPITAL | Age: 44
Discharge: HOME | End: 2024-07-26
Payer: COMMERCIAL

## 2024-07-26 VITALS — HEIGHT: 66 IN | WEIGHT: 150 LBS | BODY MASS INDEX: 24.11 KG/M2

## 2024-07-26 DIAGNOSIS — E01.0 THYROMEGALY: ICD-10-CM

## 2024-07-26 DIAGNOSIS — Z12.31 ENCOUNTER FOR SCREENING MAMMOGRAM FOR MALIGNANT NEOPLASM OF BREAST: ICD-10-CM

## 2024-07-26 PROCEDURE — 77067 SCR MAMMO BI INCL CAD: CPT

## 2024-07-26 PROCEDURE — 76536 US EXAM OF HEAD AND NECK: CPT

## 2024-07-30 DIAGNOSIS — E07.9 THYROID MASS: Primary | ICD-10-CM

## 2024-07-31 ENCOUNTER — LAB (OUTPATIENT)
Dept: LAB | Facility: LAB | Age: 44
End: 2024-07-31
Payer: COMMERCIAL

## 2024-07-31 DIAGNOSIS — E01.0 THYROMEGALY: ICD-10-CM

## 2024-07-31 DIAGNOSIS — Z00.00 WELLNESS EXAMINATION: ICD-10-CM

## 2024-07-31 LAB
ALBUMIN SERPL BCP-MCNC: 4.5 G/DL (ref 3.4–5)
ALP SERPL-CCNC: 46 U/L (ref 33–110)
ALT SERPL W P-5'-P-CCNC: 17 U/L (ref 7–45)
ANION GAP SERPL CALC-SCNC: 11 MMOL/L (ref 10–20)
AST SERPL W P-5'-P-CCNC: 15 U/L (ref 9–39)
BILIRUB SERPL-MCNC: 1.1 MG/DL (ref 0–1.2)
BUN SERPL-MCNC: 11 MG/DL (ref 6–23)
CALCIUM SERPL-MCNC: 9.4 MG/DL (ref 8.6–10.3)
CHLORIDE SERPL-SCNC: 103 MMOL/L (ref 98–107)
CHOLEST SERPL-MCNC: 162 MG/DL (ref 0–199)
CHOLESTEROL/HDL RATIO: 2.5
CO2 SERPL-SCNC: 30 MMOL/L (ref 21–32)
CREAT SERPL-MCNC: 0.82 MG/DL (ref 0.5–1.05)
EGFRCR SERPLBLD CKD-EPI 2021: >90 ML/MIN/1.73M*2
ERYTHROCYTE [DISTWIDTH] IN BLOOD BY AUTOMATED COUNT: 12.4 % (ref 11.5–14.5)
GLUCOSE SERPL-MCNC: 90 MG/DL (ref 74–99)
HCT VFR BLD AUTO: 43.5 % (ref 36–46)
HDLC SERPL-MCNC: 65.6 MG/DL
HGB BLD-MCNC: 14.1 G/DL (ref 12–16)
LDLC SERPL CALC-MCNC: 80 MG/DL
MCH RBC QN AUTO: 29.1 PG (ref 26–34)
MCHC RBC AUTO-ENTMCNC: 32.4 G/DL (ref 32–36)
MCV RBC AUTO: 90 FL (ref 80–100)
NON HDL CHOLESTEROL: 96 MG/DL (ref 0–149)
NRBC BLD-RTO: 0 /100 WBCS (ref 0–0)
PLATELET # BLD AUTO: 317 X10*3/UL (ref 150–450)
POTASSIUM SERPL-SCNC: 4.2 MMOL/L (ref 3.5–5.3)
PROT SERPL-MCNC: 6.7 G/DL (ref 6.4–8.2)
RBC # BLD AUTO: 4.84 X10*6/UL (ref 4–5.2)
SODIUM SERPL-SCNC: 140 MMOL/L (ref 136–145)
TRIGL SERPL-MCNC: 81 MG/DL (ref 0–149)
TSH SERPL-ACNC: 0.63 MIU/L (ref 0.44–3.98)
VLDL: 16 MG/DL (ref 0–40)
WBC # BLD AUTO: 6.5 X10*3/UL (ref 4.4–11.3)

## 2024-07-31 PROCEDURE — 36415 COLL VENOUS BLD VENIPUNCTURE: CPT

## 2024-07-31 PROCEDURE — 85027 COMPLETE CBC AUTOMATED: CPT

## 2024-07-31 PROCEDURE — 80061 LIPID PANEL: CPT

## 2024-07-31 PROCEDURE — 84443 ASSAY THYROID STIM HORMONE: CPT

## 2024-07-31 PROCEDURE — 80053 COMPREHEN METABOLIC PANEL: CPT

## 2024-08-29 ENCOUNTER — APPOINTMENT (OUTPATIENT)
Dept: PRIMARY CARE | Facility: CLINIC | Age: 44
End: 2024-08-29
Payer: COMMERCIAL

## 2024-08-29 VITALS
TEMPERATURE: 97.9 F | HEART RATE: 100 BPM | RESPIRATION RATE: 14 BRPM | HEIGHT: 66 IN | DIASTOLIC BLOOD PRESSURE: 68 MMHG | OXYGEN SATURATION: 98 % | SYSTOLIC BLOOD PRESSURE: 118 MMHG | BODY MASS INDEX: 25.88 KG/M2 | WEIGHT: 161 LBS

## 2024-08-29 DIAGNOSIS — M79.7 FIBROMYALGIA: ICD-10-CM

## 2024-08-29 DIAGNOSIS — M79.7 FIBROMYALGIA: Primary | ICD-10-CM

## 2024-08-29 DIAGNOSIS — E04.1 THYROID NODULE: ICD-10-CM

## 2024-08-29 PROCEDURE — 3008F BODY MASS INDEX DOCD: CPT | Performed by: INTERNAL MEDICINE

## 2024-08-29 PROCEDURE — 99214 OFFICE O/P EST MOD 30 MIN: CPT | Performed by: INTERNAL MEDICINE

## 2024-08-29 NOTE — PROGRESS NOTES
"Subjective    Stephon Smith is a 44 y.o. female who presents for Follow-up.  HPI    1 month fu   Discuss lyrica , concerned with side effects   She would like to wean off of Savella. She has been on for years. She would like to  See how she does off of it and then consider lyrica.      Review of Systems   All other systems reviewed and are negative.        Objective     /68 (BP Location: Left arm, Patient Position: Sitting, BP Cuff Size: Adult)   Pulse 100   Temp 36.6 °C (97.9 °F) (Skin)   Resp 14   Ht 1.676 m (5' 6\")   Wt 73 kg (161 lb)   SpO2 98%   BMI 25.99 kg/m²    Physical Exam  Vitals reviewed.   Constitutional:       General: She is not in acute distress.     Appearance: Normal appearance.   Cardiovascular:      Rate and Rhythm: Normal rate and regular rhythm.      Pulses: Normal pulses.      Heart sounds: Normal heart sounds.   Pulmonary:      Effort: Pulmonary effort is normal.      Breath sounds: Normal breath sounds.   Abdominal:      Tenderness: There is no abdominal tenderness.   Musculoskeletal:         General: No swelling.   Skin:     General: Skin is warm and dry.   Neurological:      Mental Status: She is alert.       Health Maintenance Due   Topic Date Due    HIV Screening  Never done    Pneumococcal Vaccine: Pediatrics (0 to 5 Years) and At-Risk Patients (6 to 64 Years) (1 of 2 - PCV) Never done    Varicella Vaccines (1 of 2 - 13+ 2-dose series) Never done    Hepatitis C Screening  Never done    Diabetes Screening  Never done    COVID-19 Vaccine (1 - 2023-24 season) Never done    DTaP/Tdap/Td Vaccines (3 - Td or Tdap) 01/03/2024    Influenza Vaccine (1) 09/01/2024          Assessment/Plan   Problem List Items Addressed This Visit       Fibromyalgia - Primary    Relevant Medications    milnacipran (SavElla) 12.5 mg tablet     Other Visit Diagnoses       Thyroid nodule        has appt with ent        Will have her wean off of Savella slowly. Decrease by 12.5 mg every 10 days. "   Decrease to 37.5 to 25 to 12.5 to 12. Every other day then dc  Call  with any problems or questions.   Follow up as needed depending on how she feels

## 2024-09-13 ENCOUNTER — APPOINTMENT (OUTPATIENT)
Dept: OTOLARYNGOLOGY | Facility: CLINIC | Age: 44
End: 2024-09-13
Payer: COMMERCIAL

## 2024-09-13 ENCOUNTER — HOSPITAL ENCOUNTER (OUTPATIENT)
Dept: RADIOLOGY | Facility: HOSPITAL | Age: 44
Discharge: HOME | End: 2024-09-13
Payer: COMMERCIAL

## 2024-09-13 VITALS — BODY MASS INDEX: 25.34 KG/M2 | HEIGHT: 66 IN | WEIGHT: 157.7 LBS

## 2024-09-13 DIAGNOSIS — R92.30 DENSE BREAST: ICD-10-CM

## 2024-09-13 DIAGNOSIS — R92.2 DENSE BREAST: ICD-10-CM

## 2024-09-13 DIAGNOSIS — E07.9 THYROID MASS: ICD-10-CM

## 2024-09-13 PROCEDURE — A9575 INJ GADOTERATE MEGLUMI 0.1ML: HCPCS | Performed by: INTERNAL MEDICINE

## 2024-09-13 PROCEDURE — 2550000001 HC RX 255 CONTRASTS: Performed by: INTERNAL MEDICINE

## 2024-09-13 PROCEDURE — 99244 OFF/OP CNSLTJ NEW/EST MOD 40: CPT | Performed by: OTOLARYNGOLOGY

## 2024-09-13 PROCEDURE — 1036F TOBACCO NON-USER: CPT | Performed by: OTOLARYNGOLOGY

## 2024-09-13 PROCEDURE — 3008F BODY MASS INDEX DOCD: CPT | Performed by: OTOLARYNGOLOGY

## 2024-09-13 PROCEDURE — 6100000003 BI MR BREAST BILATERAL WITH CONTRAST FAST SCREENING SELF PAY

## 2024-09-13 RX ORDER — GADOTERATE MEGLUMINE 376.9 MG/ML
14 INJECTION INTRAVENOUS
Status: COMPLETED | OUTPATIENT
Start: 2024-09-13 | End: 2024-09-13

## 2024-09-13 ASSESSMENT — PATIENT HEALTH QUESTIONNAIRE - PHQ9
1. LITTLE INTEREST OR PLEASURE IN DOING THINGS: NOT AT ALL
SUM OF ALL RESPONSES TO PHQ9 QUESTIONS 1 AND 2: 0
2. FEELING DOWN, DEPRESSED OR HOPELESS: NOT AT ALL

## 2024-09-13 NOTE — PROGRESS NOTES
ENT Outpatient Consultation    Chief Complaint: enlarged thyroid and thyroid nodules  History Of Present Illness  Stephon Smith is a 44 y.o. female presents for evaluation of her thyroid gland.  Patient has approximately 10-year history of thyroid nodules.  She has not had her thyroid gland evaluated in approximately 5 years.  The right side of her thyroid gland is normal in size and does not have any nodules.  The left side is significantly enlarged and has numerous nodules.  Most of the nodules are TI-RADS 2 and 3 in appearance and fall under the cutoff for biopsy.  She has 1 nodule measuring 1.6 cm that is a TI-RADS 4.  This is the only nodule that currently meets criteria for biopsy    She has had 2 different biopsies previously both of which returned as benign.  I was able to access an old ultrasound from March 2019.  At that time she had 2 nodules that were biopsied one of the measuring over 6 cm in size and having macrocalcifications.  This is one of the nodules that was biopsied.  It is unclear to me how that nodule correlates with all of the separate smaller nodules on the most recent ultrasound palpable     Past Medical History  She has a past medical history of Superior glenoid labrum lesion of unspecified shoulder, initial encounter and Unspecified lump in the right breast, unspecified quadrant (12/03/2013).    Surgical History  She has a past surgical history that includes Dilation and curettage of uterus (12/03/2013).     Social History  She reports that she has never smoked. She has never been exposed to tobacco smoke. She has never used smokeless tobacco. She reports current alcohol use. She reports that she does not use drugs.    Family History  Family History   Problem Relation Name Age of Onset    Breast cancer Mother  63    Breast cancer Maternal Grandmother  42    Breast cancer Maternal Great-Grandmother  74        Allergies  Azithromycin, Bupropion, Cefdinir, Erythromycin, Topiramate,  "Doxycycline, Latex, Minocycline, and Penicillins     Physical Exam:  CONSTITUTIONAL:  No acute distress  VOICE:  No hoarseness or other abnormality  RESPIRATION:  Breathing comfortably, no stridor  CV:  No clubbing/cyanosis/edema in hands  EYES:  EOM intact, sclera normal  NEURO:  Alert and oriented times 3, Cranial nerves II-XII grossly intact and symmetric bilaterally  HEAD AND FACE:  Symmetric facial features, no masses or lesions, sinuses non-tender to palpation  SALIVARY GLANDS:  Parotid and submandibular glands normal bilaterally  EARS:  Normal external ears, external auditory canals, and TMs to otoscopy, normal hearing to whispered voice.  NOSE:  External nose midline, anterior rhinoscopy is normal with limited visualization to the anterior aspect of the interior turbinates, no bleeding or drainage, no lesions  ORAL CAVITY/OROPHARYNX/LIPS:  Normal mucous membranes, normal floor of mouth/tongue/OP, no masses or lesions  PHARYNGEAL WALLS:  No masses or lesions  NECK/LYMPH:  No palpable LAD, enlarged left thyroid gland with palpable nodules, trachea midline  SKIN:  Neck skin is without scar or injury  PSYCH:  Alert and oriented with appropriate mood and affect     Last Recorded Vitals  Height 1.676 m (5' 6\"), weight 71.5 kg (157 lb 11.2 oz).    Relevant Results  Reviewed old records    Assessment and Plan  44 y.o. female with enlarged left-sided thyroid gland with multiple nodules.  She currently has 1 nodule that meets criteria for biopsy.  Her thyroid gland is significantly enlarged on the left.  She could also consider hemithyroidectomy for definitive management of the nodules and the enlarged gland.  Her gland has grown over time and she potentially has some compressive symptoms    -Patient is leaning toward hemithyroidectomy however has some family health issues she needs to help with over the next few months.  If she does proceed with hemithyroidectomy she may wait till after the first of the year  -She " will notify me if she wants to have an FNA before that time  -I placed an order in the system for her to repeat an ultrasound around that time as well to monitor for any changes    Amaury Chandler MD

## 2024-10-09 DIAGNOSIS — E07.9 THYROID MASS: ICD-10-CM

## 2024-10-21 ENCOUNTER — APPOINTMENT (OUTPATIENT)
Dept: PRIMARY CARE | Facility: CLINIC | Age: 44
End: 2024-10-21
Payer: COMMERCIAL

## 2024-10-21 VITALS
DIASTOLIC BLOOD PRESSURE: 68 MMHG | RESPIRATION RATE: 14 BRPM | SYSTOLIC BLOOD PRESSURE: 102 MMHG | HEART RATE: 90 BPM | HEIGHT: 66 IN | WEIGHT: 161 LBS | TEMPERATURE: 97.5 F | OXYGEN SATURATION: 99 % | BODY MASS INDEX: 25.88 KG/M2

## 2024-10-21 DIAGNOSIS — M79.7 FIBROMYALGIA: Primary | ICD-10-CM

## 2024-10-21 PROCEDURE — 1036F TOBACCO NON-USER: CPT | Performed by: INTERNAL MEDICINE

## 2024-10-21 PROCEDURE — 99214 OFFICE O/P EST MOD 30 MIN: CPT | Performed by: INTERNAL MEDICINE

## 2024-10-21 PROCEDURE — 3008F BODY MASS INDEX DOCD: CPT | Performed by: INTERNAL MEDICINE

## 2024-10-21 PROCEDURE — 90656 IIV3 VACC NO PRSV 0.5 ML IM: CPT | Performed by: INTERNAL MEDICINE

## 2024-10-21 PROCEDURE — 90471 IMMUNIZATION ADMIN: CPT | Performed by: INTERNAL MEDICINE

## 2024-10-21 RX ORDER — PREGABALIN 50 MG/1
CAPSULE ORAL
Qty: 60 CAPSULE | Refills: 0 | Status: SHIPPED | OUTPATIENT
Start: 2024-10-21

## 2024-10-21 NOTE — PROGRESS NOTES
"Subjective    Stephon Smith is a 44 y.o. female who presents for Follow-up.  HPI  Fibromyalgia fu  Weaning off Savella.   Down to 12.5mg.   Couldn't go lower d/t mood swings and fibromyalgia flare up  She would like to try lyrica.    Review of Systems   All other systems reviewed and are negative.        Objective     /68 (BP Location: Left arm, Patient Position: Sitting, BP Cuff Size: Adult)   Pulse 90   Temp 36.4 °C (97.5 °F) (Skin)   Resp 14   Ht 1.676 m (5' 6\")   Wt 73 kg (161 lb)   SpO2 99%   BMI 25.99 kg/m²    Physical Exam  Vitals reviewed.   Constitutional:       General: She is not in acute distress.     Appearance: Normal appearance.   Cardiovascular:      Rate and Rhythm: Normal rate and regular rhythm.      Pulses: Normal pulses.      Heart sounds: Normal heart sounds.   Pulmonary:      Effort: Pulmonary effort is normal.      Breath sounds: Normal breath sounds.   Abdominal:      Tenderness: There is no abdominal tenderness.   Musculoskeletal:         General: No swelling.   Skin:     General: Skin is warm and dry.   Neurological:      Mental Status: She is alert.       Health Maintenance Due   Topic Date Due    HIV Screening  Never done    Pneumococcal Vaccine: Pediatrics (0 to 5 Years) and At-Risk Patients (6 to 64 Years) (1 of 2 - PCV) Never done    Varicella Vaccines (1 of 2 - 13+ 2-dose series) Never done    Hepatitis C Screening  Never done    Diabetes Screening  Never done    DTaP/Tdap/Td Vaccines (3 - Td or Tdap) 01/03/2024    COVID-19 Vaccine (1 - 2024-25 season) Never done          Assessment/Plan   Problem List Items Addressed This Visit       Fibromyalgia - Primary    Relevant Medications    pregabalin (Lyrica) 50 mg capsule    Other Relevant Orders    Referral to [employer]   Will refer to johanna  Also add lyrica. Will try first and if she tolerates will do urine tox, etc.  Side effects discussed.   Call  with any problems or questions.   Follow up in a month    "

## 2024-10-31 DIAGNOSIS — M79.7 FIBROMYALGIA: Primary | ICD-10-CM

## 2024-10-31 RX ORDER — PREGABALIN 25 MG/1
25 CAPSULE ORAL 2 TIMES DAILY
Qty: 60 CAPSULE | Refills: 0 | Status: SHIPPED | OUTPATIENT
Start: 2024-10-31 | End: 2025-04-29

## 2024-11-21 ENCOUNTER — HOSPITAL ENCOUNTER (OUTPATIENT)
Dept: RADIOLOGY | Facility: CLINIC | Age: 44
Discharge: HOME | End: 2024-11-21
Payer: COMMERCIAL

## 2024-11-21 ENCOUNTER — APPOINTMENT (OUTPATIENT)
Dept: PRIMARY CARE | Facility: CLINIC | Age: 44
End: 2024-11-21
Payer: COMMERCIAL

## 2024-11-21 VITALS
HEIGHT: 66 IN | DIASTOLIC BLOOD PRESSURE: 70 MMHG | OXYGEN SATURATION: 99 % | BODY MASS INDEX: 26.68 KG/M2 | SYSTOLIC BLOOD PRESSURE: 108 MMHG | HEART RATE: 100 BPM | TEMPERATURE: 97.9 F | WEIGHT: 166 LBS | RESPIRATION RATE: 14 BRPM

## 2024-11-21 DIAGNOSIS — M54.16 LUMBAR RADICULOPATHY: ICD-10-CM

## 2024-11-21 DIAGNOSIS — M54.16 LUMBAR RADICULOPATHY: Primary | ICD-10-CM

## 2024-11-21 DIAGNOSIS — E04.1 THYROID NODULE: ICD-10-CM

## 2024-11-21 DIAGNOSIS — M79.7 FIBROMYALGIA: ICD-10-CM

## 2024-11-21 PROCEDURE — 99214 OFFICE O/P EST MOD 30 MIN: CPT | Performed by: INTERNAL MEDICINE

## 2024-11-21 PROCEDURE — 1036F TOBACCO NON-USER: CPT | Performed by: INTERNAL MEDICINE

## 2024-11-21 PROCEDURE — 72100 X-RAY EXAM L-S SPINE 2/3 VWS: CPT | Performed by: RADIOLOGY

## 2024-11-21 PROCEDURE — 72100 X-RAY EXAM L-S SPINE 2/3 VWS: CPT

## 2024-11-21 PROCEDURE — 3008F BODY MASS INDEX DOCD: CPT | Performed by: INTERNAL MEDICINE

## 2024-11-21 RX ORDER — METHYLPREDNISOLONE 4 MG/1
TABLET ORAL
Qty: 21 TABLET | Refills: 0 | Status: SHIPPED | OUTPATIENT
Start: 2024-11-21 | End: 2024-11-28

## 2024-11-21 NOTE — PROGRESS NOTES
"Subjective    Stephon Smith is a 44 y.o. female who presents for Fibromyalgia.  HPI    1 month fu  Was able to increase to 50mg Lyrica bid  Tolerating well.     Concerned with weight.   Hurt her back two weeks ago.   She is not sure what she did.   She has pain in her right sided does go around and sometimes down her leg  She will get numbness tingling down the leg at times.    Review of Systems   All other systems reviewed and are negative.        Objective     /70 (BP Location: Left arm, Patient Position: Sitting, BP Cuff Size: Adult)   Pulse 100   Temp 36.6 °C (97.9 °F) (Skin)   Resp 14   Ht 1.676 m (5' 6\")   Wt 75.3 kg (166 lb)   SpO2 99%   BMI 26.79 kg/m²    Physical Exam  Vitals reviewed.   Constitutional:       General: She is not in acute distress.     Appearance: Normal appearance.   Cardiovascular:      Rate and Rhythm: Normal rate and regular rhythm.      Pulses: Normal pulses.      Heart sounds: Normal heart sounds.   Pulmonary:      Effort: Pulmonary effort is normal.      Breath sounds: Normal breath sounds.   Abdominal:      Tenderness: There is no abdominal tenderness.   Musculoskeletal:         General: No swelling.      Lumbar back: Tenderness present. No spasms. Positive right straight leg raise test. Negative left straight leg raise test.   Skin:     General: Skin is warm and dry.   Neurological:      Mental Status: She is alert.       Health Maintenance Due   Topic Date Due    HIV Screening  Never done    Pneumococcal Vaccine: Pediatrics (0 to 5 Years) and At-Risk Patients (6 to 64 Years) (1 of 2 - PCV) Never done    Varicella Vaccines (1 of 2 - 13+ 2-dose series) Never done    Hepatitis C Screening  Never done    Diabetes Screening  Never done    DTaP/Tdap/Td Vaccines (3 - Td or Tdap) 01/03/2024    COVID-19 Vaccine (1 - 2024-25 season) Never done          Assessment/Plan   Problem List Items Addressed This Visit       Fibromyalgia     Other Visit Diagnoses       Lumbar " radiculopathy    -  Primary    Relevant Medications    methylPREDNISolone (Medrol Dospak) 4 mg tablets    Other Relevant Orders    XR lumbar spine 2-3 views    Thyroid nodule            She is doing well at 50 mg will have her continue  She has sx of lumbar radiculopathy. Have her rest, medrol pack. Check xray  Call  with any problems or questions.   Follow up if sx do not improve or worsen.

## 2024-12-07 DIAGNOSIS — M79.7 FIBROMYALGIA: ICD-10-CM

## 2024-12-09 RX ORDER — PREGABALIN 50 MG/1
50 CAPSULE ORAL 2 TIMES DAILY
Qty: 180 CAPSULE | Refills: 3 | Status: SHIPPED | OUTPATIENT
Start: 2024-12-09 | End: 2025-12-09

## 2024-12-28 ENCOUNTER — PATIENT MESSAGE (OUTPATIENT)
Dept: PRIMARY CARE | Facility: CLINIC | Age: 44
End: 2024-12-28
Payer: COMMERCIAL

## 2024-12-28 DIAGNOSIS — M54.16 LUMBAR RADICULOPATHY: ICD-10-CM

## 2024-12-31 DIAGNOSIS — M79.7 FIBROMYALGIA: ICD-10-CM

## 2025-01-14 ENCOUNTER — LAB (OUTPATIENT)
Dept: LAB | Facility: LAB | Age: 45
End: 2025-01-14
Payer: COMMERCIAL

## 2025-01-14 DIAGNOSIS — E07.9 THYROID MASS: ICD-10-CM

## 2025-01-14 LAB
ALBUMIN SERPL BCP-MCNC: 4.5 G/DL (ref 3.4–5)
ALP SERPL-CCNC: 42 U/L (ref 33–110)
ALT SERPL W P-5'-P-CCNC: 20 U/L (ref 7–45)
ANION GAP SERPL CALC-SCNC: 10 MMOL/L (ref 10–20)
AST SERPL W P-5'-P-CCNC: 16 U/L (ref 9–39)
BILIRUB SERPL-MCNC: 0.7 MG/DL (ref 0–1.2)
BUN SERPL-MCNC: 12 MG/DL (ref 6–23)
CALCIUM SERPL-MCNC: 9.5 MG/DL (ref 8.6–10.3)
CHLORIDE SERPL-SCNC: 106 MMOL/L (ref 98–107)
CO2 SERPL-SCNC: 29 MMOL/L (ref 21–32)
CREAT SERPL-MCNC: 0.82 MG/DL (ref 0.5–1.05)
EGFRCR SERPLBLD CKD-EPI 2021: >90 ML/MIN/1.73M*2
ERYTHROCYTE [DISTWIDTH] IN BLOOD BY AUTOMATED COUNT: 12.3 % (ref 11.5–14.5)
GLUCOSE SERPL-MCNC: 100 MG/DL (ref 74–99)
HCT VFR BLD AUTO: 42.6 % (ref 36–46)
HGB BLD-MCNC: 14.3 G/DL (ref 12–16)
MCH RBC QN AUTO: 29.4 PG (ref 26–34)
MCHC RBC AUTO-ENTMCNC: 33.6 G/DL (ref 32–36)
MCV RBC AUTO: 88 FL (ref 80–100)
NRBC BLD-RTO: 0 /100 WBCS (ref 0–0)
PLATELET # BLD AUTO: 286 X10*3/UL (ref 150–450)
POTASSIUM SERPL-SCNC: 4.7 MMOL/L (ref 3.5–5.3)
PROT SERPL-MCNC: 6.5 G/DL (ref 6.4–8.2)
RBC # BLD AUTO: 4.87 X10*6/UL (ref 4–5.2)
SODIUM SERPL-SCNC: 140 MMOL/L (ref 136–145)
WBC # BLD AUTO: 5.3 X10*3/UL (ref 4.4–11.3)

## 2025-01-14 PROCEDURE — 80053 COMPREHEN METABOLIC PANEL: CPT

## 2025-01-14 PROCEDURE — 85027 COMPLETE CBC AUTOMATED: CPT

## 2025-01-23 NOTE — PROGRESS NOTES
Assessment/Plan   LBP and BL L>R gluteal pain potentially linked to GTPS vs hip impingement. Neck pain appears myofascial. Likely complicated by FMS. Ordering hip radiographs BL.  Treatment will involve soft tissue and spinal manipulation, we will avoid dry needling due to patient preference to initially avoid, but to consider at a later date if needed.    LS XR 2024 - IMPRESSION:  Normal radiograph of the lumbar spine    Visits this year: 1    Subjective     HPI - Neck pain 1/24/25 -patient reports chronic neck pain and tightness at least as early as 2020 but seems to have been worse since fall 2024 without specific trauma or injury.  Symptoms are localized without radiation numbness or tingling or headache.  She does have a history of right shoulder labrum tear and repair which was successful however over the past few months the right shoulder has begun bothering her again with limited range of motion.  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  She also endorses occasional radiating or aching dull diffuse pain or numbness and tingling in the right lower extremity and lateral foot.  Previously she had seen a chiropractor with some relief of symptoms although symptoms appear worse now than when she visited the chiropractor years ago.  She works as an ultrasonographer and notes that this may aggravate symptoms that she is sitting in awkward positions but tries to keep things ergonomic.  Pain is present despite position whether she is sitting standing or lying down    Review of Systems   Constitutional:  Negative for fever.   Eyes:  Negative for visual disturbance.   Respiratory:  Negative for shortness of breath.    Cardiovascular:  Negative for chest  pain.   Gastrointestinal:  Negative for diarrhea, nausea and vomiting.   Genitourinary:  Negative for difficulty urinating and dysuria.   Skin:  Negative for color change.   Neurological:  Positive for dizziness (Occasional episodes. Previous Dx of POTS).   Psychiatric/Behavioral:  Negative for agitation.    All other systems reviewed and are negative.    Objective   Examination findings (e.g., palpation & ROM):   Decreased C/S and L/S ROM with pain, hypertonic and tender cervical and lumbar erectors, BL upper trapezius & g med  SLR BL 50 with tightness  Pain locally w/ FAIR and ELIAS BL    Segmental joint dysfunction was identified in the following areas using motion palpation and/or pain provocation assessment:  Cervical: 1-2  Thoracic: 5-8  Lumbopelvic: 1, BL SIJ      Physical Exam  Neurological:      General: No focal deficit present.      Mental Status: She is alert.      Sensory: Sensation is intact.      Motor: Motor function is intact.      Coordination: Coordination is intact.      Gait: Gait is intact.      Deep Tendon Reflexes: Reflexes are normal and symmetric.      Reflex Scores:       Tricep reflexes are 2+ on the right side and 2+ on the left side.       Bicep reflexes are 2+ on the right side and 2+ on the left side.       Brachioradialis reflexes are 2+ on the right side and 2+ on the left side.       Patellar reflexes are 2+ on the right side and 2+ on the left side.       Achilles reflexes are 2+ on the right side and 2+ on the left side.     Comments: 1/24/25     Plan   Today's treatment:  SMT to regions of segmental dysfunction identified on exam, using age-appropriate force, and manual diversified technique.   STM to patient tolerance to hypertonic paraspinal muscles  Manual dynamic stretching of the gluteal muscles, hamstrings, upper trapezius  3 to 3:15 PM  Patient noted improved mobility and reduced pain post-treatment    Treatment Plan:   The patient and I discussed the risks and benefits  of chiropractic care. Based on the patient's subjective complaints along with the examination findings, it is advised that a course of chiropractic treatment be initiated. The patient provided consent for care. The patient tolerated today's treatment with little or no additional discomfort and was instructed to contact the office for questions or concerns. Will see patient once per week then every 2 weeks when symptoms become mild/manageable, further spaced apart contingent upon improvement.     This chart note was generated using dictation software, and as such, there may be typographical errors present. Abbreviations: Cervical spine (CS), cervical-thoracic (CT), Dry needling (DN), Flexion adduction internal rotation (FAIR), high velocity, low amplitude (HVLA), Lumbar spine (LS), Soft tissue manipulation (STM), spinal manipulative therapy (SMT), Straight leg raise (SLR), Thoracic spine (TS).

## 2025-01-24 ENCOUNTER — HOSPITAL ENCOUNTER (OUTPATIENT)
Dept: RADIOLOGY | Facility: HOSPITAL | Age: 45
Discharge: HOME | End: 2025-01-24
Payer: COMMERCIAL

## 2025-01-24 ENCOUNTER — APPOINTMENT (OUTPATIENT)
Dept: INTEGRATIVE MEDICINE | Facility: CLINIC | Age: 45
End: 2025-01-24
Payer: COMMERCIAL

## 2025-01-24 DIAGNOSIS — M79.7 FIBROMYALGIA: ICD-10-CM

## 2025-01-24 DIAGNOSIS — M99.02 SOMATIC DYSFUNCTION OF THORACIC REGION: ICD-10-CM

## 2025-01-24 DIAGNOSIS — M54.2 NECK PAIN: ICD-10-CM

## 2025-01-24 DIAGNOSIS — M99.05 SOMATIC DYSFUNCTION OF PELVIS REGION: ICD-10-CM

## 2025-01-24 DIAGNOSIS — M54.50 CHRONIC BILATERAL LOW BACK PAIN, UNSPECIFIED WHETHER SCIATICA PRESENT: ICD-10-CM

## 2025-01-24 DIAGNOSIS — M25.551 BILATERAL HIP PAIN: Primary | ICD-10-CM

## 2025-01-24 DIAGNOSIS — M99.03 SOMATIC DYSFUNCTION OF LUMBAR REGION: ICD-10-CM

## 2025-01-24 DIAGNOSIS — M25.552 BILATERAL HIP PAIN: Primary | ICD-10-CM

## 2025-01-24 DIAGNOSIS — G89.29 CHRONIC BILATERAL LOW BACK PAIN, UNSPECIFIED WHETHER SCIATICA PRESENT: ICD-10-CM

## 2025-01-24 DIAGNOSIS — M99.01 CERVICAL SEGMENT DYSFUNCTION: ICD-10-CM

## 2025-01-24 DIAGNOSIS — E07.9 THYROID MASS: ICD-10-CM

## 2025-01-24 PROCEDURE — 70491 CT SOFT TISSUE NECK W/DYE: CPT

## 2025-01-24 PROCEDURE — 98941 CHIROPRACT MANJ 3-4 REGIONS: CPT | Performed by: CHIROPRACTOR

## 2025-01-24 PROCEDURE — 97140 MANUAL THERAPY 1/> REGIONS: CPT | Performed by: CHIROPRACTOR

## 2025-01-24 PROCEDURE — 2550000001 HC RX 255 CONTRASTS: Performed by: OTOLARYNGOLOGY

## 2025-01-24 RX ADMIN — IOHEXOL 70 ML: 350 INJECTION, SOLUTION INTRAVENOUS at 13:28

## 2025-01-24 ASSESSMENT — ENCOUNTER SYMPTOMS
SHORTNESS OF BREATH: 0
NAUSEA: 0
VOMITING: 0
DIFFICULTY URINATING: 0
COLOR CHANGE: 0
DYSURIA: 0
AGITATION: 0
DIARRHEA: 0
DIZZINESS: 1
FEVER: 0

## 2025-01-30 ENCOUNTER — APPOINTMENT (OUTPATIENT)
Dept: OTOLARYNGOLOGY | Facility: CLINIC | Age: 45
End: 2025-01-30
Payer: COMMERCIAL

## 2025-01-30 VITALS — BODY MASS INDEX: 26.15 KG/M2 | WEIGHT: 162.7 LBS | HEIGHT: 66 IN

## 2025-01-30 DIAGNOSIS — E07.9 THYROID MASS: ICD-10-CM

## 2025-01-30 PROCEDURE — 1036F TOBACCO NON-USER: CPT | Performed by: OTOLARYNGOLOGY

## 2025-01-30 PROCEDURE — 99214 OFFICE O/P EST MOD 30 MIN: CPT | Performed by: OTOLARYNGOLOGY

## 2025-01-30 PROCEDURE — 31575 DIAGNOSTIC LARYNGOSCOPY: CPT | Performed by: OTOLARYNGOLOGY

## 2025-01-30 PROCEDURE — 3008F BODY MASS INDEX DOCD: CPT | Performed by: OTOLARYNGOLOGY

## 2025-01-30 RX ORDER — LORATADINE 10 MG/1
10 TABLET ORAL DAILY
COMMUNITY

## 2025-01-30 ASSESSMENT — PATIENT HEALTH QUESTIONNAIRE - PHQ9
2. FEELING DOWN, DEPRESSED OR HOPELESS: SEVERAL DAYS
1. LITTLE INTEREST OR PLEASURE IN DOING THINGS: NOT AT ALL
SUM OF ALL RESPONSES TO PHQ9 QUESTIONS 1 AND 2: 1

## 2025-01-30 ASSESSMENT — PAIN SCALES - GENERAL: PAINLEVEL_OUTOF10: 0-NO PAIN

## 2025-01-30 NOTE — PROGRESS NOTES
ENT Outpatient Consultation    Chief Complaint: enlarged thyroid and thyroid nodules  History Of Present Illness  Stephon Smith is a 44 y.o. female presents for evaluation of her thyroid gland.  Patient has approximately 10-year history of thyroid nodules.  She has not had her thyroid gland evaluated in approximately 5 years.  The right side of her thyroid gland is normal in size and does not have any nodules.  The left side is significantly enlarged and has numerous nodules.  Most of the nodules are TI-RADS 2 and 3 in appearance and fall under the cutoff for biopsy.  She has 1 nodule measuring 1.6 cm that is a TI-RADS 4.  This is the only nodule that currently meets criteria for biopsy    She has had 2 different biopsies previously both of which returned as benign.  I was able to access an old ultrasound from March 2019.  At that time she had 2 nodules that were biopsied one of the measuring over 6 cm in size and having macrocalcifications.  This is one of the nodules that was biopsied.  It is unclear to me how that nodule correlates with all of the separate smaller nodules on the most recent ultrasound palpable    1/30/25: Patient returns for follow-up to discuss upcoming surgery.  She had a CT scan of her chest which was reviewed.  There is no significant substernal extension.  The right side of her thyroid still appears normal.  She is scheduled for hemithyroidectomy in a few weeks     Past Medical History  She has a past medical history of Superior glenoid labrum lesion of unspecified shoulder, initial encounter and Unspecified lump in the right breast, unspecified quadrant (12/03/2013).    Surgical History  She has a past surgical history that includes Dilation and curettage of uterus (12/03/2013).     Social History  She reports that she has never smoked. She has never been exposed to tobacco smoke. She has never used smokeless tobacco. She reports current alcohol use. She reports that she does not use  drugs.    Family History  Family History   Problem Relation Name Age of Onset    Breast cancer Mother  63    Breast cancer Maternal Grandmother  42    Breast cancer Maternal Great-Grandmother  74        Allergies  Azithromycin, Bupropion, Cefdinir, Erythromycin, Topiramate, Doxycycline, Latex, Minocycline, and Penicillins     Physical Exam:  CONSTITUTIONAL:  No acute distress  VOICE:  No hoarseness or other abnormality  RESPIRATION:  Breathing comfortably, no stridor  CV:  No clubbing/cyanosis/edema in hands  EYES:  EOM intact, sclera normal  NEURO:  Alert and oriented times 3, Cranial nerves II-XII grossly intact and symmetric bilaterally  HEAD AND FACE:  Symmetric facial features, no masses or lesions, sinuses non-tender to palpation  SALIVARY GLANDS:  Parotid and submandibular glands normal bilaterally  EARS:  Normal external ears, external auditory canals, and TMs to otoscopy, normal hearing to whispered voice.  NOSE:  External nose midline, anterior rhinoscopy is normal with limited visualization to the anterior aspect of the interior turbinates, no bleeding or drainage, no lesions  ORAL CAVITY/OROPHARYNX/LIPS:  Normal mucous membranes, normal floor of mouth/tongue/OP, no masses or lesions  PHARYNGEAL WALLS:  No masses or lesions  NECK/LYMPH:  No palpable LAD, enlarged left thyroid gland with palpable nodules, trachea midline  SKIN:  Neck skin is without scar or injury  PSYCH:  Alert and oriented with appropriate mood and affect    Procedure Note: Flexible Nasolaryngoscopy  Verbal informed consent was obtained from the patient/patient's guardian. 4% lidocaine mixed with phenylephrine was prepared and dripped into the nose. It was placed in the right naris. Following an appropriate amount of time to allow for adequate anesthesia, a flexible fiberoptic nasolaryngoscope was placed into the patient's right naris. The nasal cavity, nasopharynx, oropharynx, hypopharynx, and all endolaryngeal structures were  "visualized and were normal except as listed below. Significant findings included:  -True vocal cord mobile bilaterally       Last Recorded Vitals  Height 1.676 m (5' 6\"), weight 73.8 kg (162 lb 11.2 oz).    Relevant Results  Reviewed old records    Assessment and Plan  44 y.o. female with enlarged left-sided thyroid gland with multiple nodules.  She currently has 1 nodule that meets criteria for biopsy.  Her thyroid gland is significantly enlarged on the left.  She could also consider hemithyroidectomy for definitive management of the nodules and the enlarged gland.  Her gland has grown over time and she potentially has some compressive symptoms    -We reviewed her workup including her CT scan, prior ultrasounds, biopsy.  We discussed hemithyroidectomy including risks of bleeding, infection, numbness, damage to recurrent laryngeal nerve, wrist to parathyroid glands, and possible need for completion thyroidectomy in a separate setting.  We also discussed drain placement.  He will plan to stay overnight however if she feels good and comfortable with the drain may consider going home after surgery.  All questions were answered    Amaury Chandler MD    "

## 2025-02-05 ASSESSMENT — DUKE ACTIVITY SCORE INDEX (DASI)
CAN YOU DO HEAVY WORK AROUND THE HOUSE LIKE SCRUBBING FLOORS OR LIFTING AND MOVING HEAVY FURNITURE: YES
CAN YOU DO YARD WORK LIKE RAKING LEAVES, WEEDING OR PUSHING A MOWER: YES
CAN YOU DO LIGHT WORK AROUND THE HOUSE LIKE DUSTING OR WASHING DISHES: YES
CAN YOU PARTICIPATE IN STRENOUS SPORTS LIKE SWIMMING, SINGLES TENNIS, FOOTBALL, BASKETBALL, OR SKIING: NO
CAN YOU CLIMB A FLIGHT OF STAIRS OR WALK UP A HILL: YES
TOTAL_SCORE: 50.7
CAN YOU DO MODERATE WORK AROUND THE HOUSE LIKE VACUUMING, SWEEPING FLOORS OR CARRYING GROCERIES: YES
CAN YOU TAKE CARE OF YOURSELF (EAT, DRESS, BATHE, OR USE TOILET): YES
DASI METS SCORE: 9
CAN YOU WALK A BLOCK OR TWO ON LEVEL GROUND: YES
CAN YOU PARTICIPATE IN MODERATE RECREATIONAL ACTIVITIES LIKE GOLF, BOWLING, DANCING, DOUBLES TENNIS OR THROWING A BASEBALL OR FOOTBALL: YES
CAN YOU HAVE SEXUAL RELATIONS: YES
CAN YOU RUN A SHORT DISTANCE: YES
CAN YOU WALK INDOORS, SUCH AS AROUND YOUR HOUSE: YES

## 2025-02-05 ASSESSMENT — LIFESTYLE VARIABLES: SMOKING_STATUS: NONSMOKER

## 2025-02-05 ASSESSMENT — ACTIVITIES OF DAILY LIVING (ADL): ADL_SCORE: 0

## 2025-02-05 NOTE — H&P (VIEW-ONLY)
CPM/PAT Evaluation       Name: Stephon Smith (Stephon Smith)  /Age: 1980/44 y.o.     In-Person       Chief Complaint: thyroid nodules    HPI  This is a very pleasant 44-year-old with a past medical history of fibromyalgia, anxiety, asthma, GERD, and thyroid mass.  Patient states that she has had previous biopsies on the thyroid nodules in  and .  Over the last year the mass and nodules have increased in size and she has had an abnormal ultrasound.    Patient reports mass is affecting her swallowing.   She denies recent fever or chills.  Plan is for left thyroidectomy with Dr. Chandler on .    Past Medical History:   Diagnosis Date    Anxiety     Arthritis     Asthma     Disease of thyroid gland     Dizziness     Fibromyalgia, primary     GERD (gastroesophageal reflux disease)     Goiter     Joint pain     Lumbar disc disease     PONV (postoperative nausea and vomiting)     Superior glenoid labrum lesion of unspecified shoulder, initial encounter     Superior labrum anterior-to-posterior (SLAP) tear of shoulder    Unspecified lump in the right breast, unspecified quadrant 2013    Mass of right breast       Past Surgical History:   Procedure Laterality Date    DILATION AND CURETTAGE OF UTERUS  2013    Dilation And Curettage    SHOULDER SURGERY         Patient  has no history on file for sexual activity.    Family History   Problem Relation Name Age of Onset    Breast cancer Mother  63    Breast cancer Maternal Grandmother  42    Breast cancer Maternal Great-Grandmother  74       Allergies   Allergen Reactions    Azithromycin Hives     itchy pimples on chest and back after finishing course of abx    Bupropion Hives    Cefdinir Hives     dizziness    Erythromycin Hives     itchy pimples on chest and back after finishing course of abx    Topiramate Other     confusion/altered mental state    Doxycycline Hives and Rash    Latex Hives, Itching and Rash    Minocycline Hives and Rash     Penicillins Hives and Rash     itchy pimples on chest and back after finishing course of abx       Prior to Admission medications    Medication Sig Start Date End Date Taking? Authorizing Provider   albuterol 2.5 mg /3 mL (0.083 %) nebulizer solution Take 3 mL (2.5 mg) by nebulization 4 times a day as needed for wheezing or shortness of breath. 5/12/23 1/30/25  Marga Johnson DO   albuterol 90 mcg/actuation inhaler Inhale 2 puffs every 4 hours if needed. 9/11/19   Historical Provider, MD   ALPRAZolam (Xanax) 0.5 mg tablet Take 1 tablet (0.5 mg) by mouth 3 times a day as needed for anxiety for up to 6 days. 7/9/24 1/30/25  Marga Johnson DO   cyclobenzaprine (Flexeril) 10 mg tablet Take 1 tablet (10 mg) by mouth 2 times a day as needed. 2/10/21   Historical Provider, MD   fluticasone (Flonase) 50 mcg/actuation nasal spray Administer 2 sprays into affected nostril(s) once daily. 5/1/19   Historical Provider, MD   levonorgestrel (Mirena) 21 mcg/24 hours (8 yrs) 52 mg IUD by intrauterine route. 11/24/21   Historical Provider, MD   loratadine (Claritin) 10 mg tablet Take 1 tablet (10 mg) by mouth once daily.    Historical Provider, MD   magnesium oxide (Mag-Ox) 400 mg tablet Take by mouth. 2/10/21   Historical Provider, MD   milnacipran (Savella) 12.5 mg tablet Take 1 tablet (12.5 mg) by mouth once daily. 12/31/24   Marga Johnson DO   potassium gluconate 550 mg (90 mg) tablet Take by mouth.    Historical Provider, MD   pregabalin (Lyrica) 50 mg capsule Take 1 capsule (50 mg) by mouth 2 times a day. 12/9/24 12/9/25  Marga Johnson DO   promethazine (Phenergan) 25 mg tablet Take 1 tablet (25 mg) by mouth every 6 hours if needed for vomiting or nausea. 7/31/23   Marga Johnson DO   SUMAtriptan (Imitrex) 50 mg tablet Take 1 tablet (50 mg) by mouth 1 time if needed for migraine. 8/29/19   Historical Provider, MD LORD ROS:   Constitutional:   neg    Neuro/Psych:    Hx of anxiety- sees  counselor  Eyes:   neg    Ears:   neg    Nose:   neg    Mouth:   neg    Throat:   neg    Neck:      No carotid bruits auscultated  neg    Cardio:    Hx of syncope   Full negative cardiac workup  Respiratory:    Hx of asthma- -good control  Endocrine:    See HPI  GI:    Hx of GERD- had taken PPI in past good control  :   neg    Musculoskeletal:    Hx of fibromyalgia     patient currently is undergoing physical therapy for right hip and thigh discomfort  Hematologic:   neg    Skin:  neg        Physical Exam  Constitutional:       Appearance: Normal appearance.   HENT:      Head: Normocephalic and atraumatic.      Nose: Nose normal.      Mouth/Throat:      Mouth: Mucous membranes are moist.   Eyes:      Pupils: Pupils are equal, round, and reactive to light.   Neck:      Comments:   No carotid bruits auscultated  Cardiovascular:      Rate and Rhythm: Normal rate and regular rhythm.   Pulmonary:      Effort: Pulmonary effort is normal.      Breath sounds: Normal breath sounds.   Abdominal:      General: Bowel sounds are normal.      Palpations: Abdomen is soft.   Musculoskeletal:      Cervical back: Normal range of motion.      Comments:  no lower extremity ankle edema   Skin:     General: Skin is warm and dry.   Neurological:      General: No focal deficit present.      Mental Status: She is alert and oriented to person, place, and time.   Psychiatric:         Mood and Affect: Mood normal.         Behavior: Behavior normal.      Airway:nl neck ROM  Anesthesia: Hx of PONV  Teeth: partial    Testing/Diagnostic:   Recent Results (from the past 4 weeks)   CBC    Collection Time: 01/14/25 11:05 AM   Result Value Ref Range    WBC 5.3 4.4 - 11.3 x10*3/uL    nRBC 0.0 0.0 - 0.0 /100 WBCs    RBC 4.87 4.00 - 5.20 x10*6/uL    Hemoglobin 14.3 12.0 - 16.0 g/dL    Hematocrit 42.6 36.0 - 46.0 %    MCV 88 80 - 100 fL    MCH 29.4 26.0 - 34.0 pg    MCHC 33.6 32.0 - 36.0 g/dL    RDW 12.3 11.5 - 14.5 %    Platelets 286 150 - 450 x10*3/uL  "  Comprehensive Metabolic Panel    Collection Time: 01/14/25 11:05 AM   Result Value Ref Range    Glucose 100 (H) 74 - 99 mg/dL    Sodium 140 136 - 145 mmol/L    Potassium 4.7 3.5 - 5.3 mmol/L    Chloride 106 98 - 107 mmol/L    Bicarbonate 29 21 - 32 mmol/L    Anion Gap 10 10 - 20 mmol/L    Urea Nitrogen 12 6 - 23 mg/dL    Creatinine 0.82 0.50 - 1.05 mg/dL    eGFR >90 >60 mL/min/1.73m*2    Calcium 9.5 8.6 - 10.3 mg/dL    Albumin 4.5 3.4 - 5.0 g/dL    Alkaline Phosphatase 42 33 - 110 U/L    Total Protein 6.5 6.4 - 8.2 g/dL    AST 16 9 - 39 U/L    Bilirubin, Total 0.7 0.0 - 1.2 mg/dL    ALT 20 7 - 45 U/L     Patient Specialist/PCP:     Visit Vitals  /62   Pulse 90   Temp 36.5 °C (97.7 °F) (Temporal)   Resp 14   Ht 1.676 m (5' 6\")   Wt 74.8 kg (164 lb 14.5 oz)   SpO2 96%   BMI 26.62 kg/m²   OB Status Implant   Smoking Status Never   BSA 1.87 m²       DASI Risk Score      Flowsheet Row Pre-Admission Testing from 2/6/2025 in [facility]   Can you take care of yourself (eat, dress, bathe, or use toilet)?  2.75 filed at 02/05/2025 1209   Can you walk indoors, such as around your house? 1.75 filed at 02/05/2025 1209   Can you walk a block or two on level ground?  2.75 filed at 02/05/2025 1209   Can you climb a flight of stairs or walk up a hill? 5.5 filed at 02/05/2025 1209   Can you run a short distance? 8 filed at 02/05/2025 1209   Can you do light work around the house like dusting or washing dishes? 2.7 filed at 02/05/2025 1209   Can you do moderate work around the house like vacuuming, sweeping floors or carrying groceries? 3.5 filed at 02/05/2025 1209   Can you do heavy work around the house like scrubbing floors or lifting and moving heavy furniture?  8 filed at 02/05/2025 1209   Can you do yard work like raking leaves, weeding or pushing a mower? 4.5 filed at 02/05/2025 1209   Can you have sexual relations? 5.25 filed at 02/05/2025 1209   Can you participate in moderate recreational activities " like golf, bowling, dancing, doubles tennis or throwing a baseball or football? 6 filed at 02/05/2025 1209   Can you participate in strenous sports like swimming, singles tennis, football, basketball, or skiing? 0 filed at 02/05/2025 1209   DASI SCORE 50.7 filed at 02/05/2025 1209   METS Score (Will be calculated only when all the questions are answered) 9 filed at 02/05/2025 1209          Caprini DVT Assessment      Flowsheet Row Pre-Admission Testing from 2/6/2025 in [facility]   Surgical Factors Major surgery planned, lasting over 3 hours filed at 02/05/2025 1205          Modified Frailty Index      Flowsheet Row Pre-Admission Testing from 2/6/2025 in [facility]   Non-independent functional status (problems with dressing, bathing, personal grooming, or cooking) 0 filed at 02/05/2025 1209   History of diabetes mellitus  0 filed at 02/05/2025 1209   History of COPD 0 filed at 02/05/2025 1209   History of CHF No filed at 02/05/2025 1209   History of MI 0 filed at 02/05/2025 1209   History of Percutaneous Coronary Intervention, Cardiac Surgery, or Angina No filed at 02/05/2025 1209   Hypertension requiring the use of medication  0 filed at 02/05/2025 1209   Peripheral vascular disease 0 filed at 02/05/2025 1209   Impaired sensorium (cognitive impairement or loss, clouding, or delirium) 0 filed at 02/05/2025 1209   TIA or CVA withouy residual deficit 0 filed at 02/05/2025 1209   Cerebrovascular accident with deficit 0 filed at 02/05/2025 1209   Modified Frailty Index Calculator 0 filed at 02/05/2025 1209          CHADS2 Stroke Risk  Current as of yesterday        N/A 3 to 100%: High Risk   2 to < 3%: Medium Risk   0 to < 2%: Low Risk     Last Change: N/A          This score determines the patient's risk of having a stroke if the patient has atrial fibrillation.        This score is not applicable to this patient. Components are not calculated.          Revised Cardiac Risk Index       Flowsheet Row Pre-Admission Testing from 2/6/2025 in [facility]   High-Risk Surgery (Intraperitoneal, Intrathoracic,Suprainguinal vascular) 0 filed at 02/05/2025 1209   History of ischemic heart disease (History of MI, History of positive exercuse test, Current chest paint considered due to myocardial ischemia, Use of nitrate therapy, ECG with pathological Q Waves) 0 filed at 02/05/2025 1209   History of congestive heart failure (pulmonary edemia, bilateral rales or S3 gallop, Paroxysmal nocturnal dyspnea, CXR showing pulmonary vascular redistribution) 0 filed at 02/05/2025 1209   History of cerebrovascular disease (Prior TIA or stroke) 0 filed at 02/05/2025 1209   Pre-operative insulin treatment 0 filed at 02/05/2025 1209   Pre-operative creatinine>2 mg/dl 0 filed at 02/05/2025 1209   Revised Cardiac Risk Calculator 0 filed at 02/05/2025 1209          Apfel Simplified Score      Flowsheet Row Pre-Admission Testing from 2/6/2025 in [facility]   Smoking status 1 filed at 02/05/2025 1209   History of motion sickness or PONV  1 filed at 02/05/2025 1209   Use of postoperative opioids 0 filed at 02/05/2025 1209   Gender - Female 1=Yes filed at 02/05/2025 1209   Apfel Simplified Score Calculator 3 filed at 02/05/2025 1209          Risk Analysis Index Results This Encounter         2/5/2025  1209             Do you live in a place other than your own home?: 0    When did you begin living in the place you are currently residing?: Greater than one year ago    Any kidney failure, kidney not working well, or seeing a kidney doctor (nephrologist)? If yes, was this for kidney stones or another problem?: 0 No    Any history of chronic (long-term) congestive heart failure (CHF)?: 0 No    Any shortness of breath when resting?: 0 No    In the past five years, have you been diagnosed with or treated for cancer?: No    During the last 3 months has it become difficult for you to remember things or  organize your thoughts?: 0 No    Have you lost weight of 10 pounds or more in the past 3 months without trying?: 0 No    Do you have any loss of appetitie?: 0 No    Getting Around (Mobility): 0 Can get around without help    Eatin Can plan and prepare own meals    Toiletin Can use toilet without any help    Personal Hygiene (Bathing, Hand Washing, Changing Clothes): 0 Can shower or bathe without any help    LUCAS Cancer History: Patient does not indicate history of cancer    Total Risk Analysis Index Score Without Cancer: 10    Total Risk Analysis Index Score: 10          Stop Bang Score      Flowsheet Row Pre-Admission Testing from 2025 in [facility]   Do you snore loudly? 0 filed at 2025 120   Do you often feel tired or fatigued after your sleep? 0 filed at 2025 1208   Has anyone ever observed you stop breathing in your sleep? 0 filed at 2025 1208   Do you have or are you being treated for high blood pressure? 0 filed at 2025 1208   Recent BMI (Calculated) 26.3 filed at 2025 1208   Is BMI greater than 35 kg/m2? 0=No filed at 2025 1208   Age older than 50 years old? 0=No filed at 2025 1208   Is your neck circumference greater than 17 inches (Male) or 16 inches (Female)? 0 filed at 2025 1208   Gender - Male 0=No filed at 2025 1208   STOP-BANG Total Score 0 filed at 2025 1208          Prodigy: High Risk  Total Score: 0          ARISCAT Score for Postoperative Pulmonary Complications      Flowsheet Row Pre-Admission Testing from 2025 in [facility]   Age Calculated Score 0 filed at 2025 1209   Preoperative SpO2 0 filed at 2025 1209   Respiratory infection in the last month Either upper or lower (i.e., URI, bronchitis, pneumonia), with fever and antibiotic treatment 0 filed at 2025 120   Preoperative anemia (Hgb less than 10 g/dl) 0 filed at 2025 1209   Surgical incision  0 filed at  2025 120   Duration of surgery  23 filed at 2025 120   Emergency Procedure  0 filed at 2025   ARISCAT Total Score  23 filed at 2025 120          Jossue Perioperative Risk for Myocardial Infarction or Cardiac Arrest (JERMAIN)      Flowsheet Row Pre-Admission Testing from 2025 in [facility]   Calculated Age Score 0.84 filed at 2025   Functional Status  0 filed at 2025   ASA Class  -5.17 filed at 2025   Creatinine 0 filed at 2025   Type of Procedure  0.18 filed at 2025   JERMAIN Total Score  -9.4 filed at 2025   JERMAIN % 0.01 filed at 2025            Assessment and Plan:   Plan for OR on  for        LEFT ABAD-THYROIDECTOMY W/NERVE MONITOR [25815 (CPT®)] Left General   Left hemithyroidectomy with nerve monitor     Due to thyroid mass  Most recent labs enclosed    HEENT/Airway:  no significant findings on chart review or clinical presentation    Cardiovascular:  no significant findings on chart review or clinical presentation  DASI  [employer] Activity Status Index (DASI)  DASI Score: 50.7   MET Score: 9  RCRI: 0 points, 3.9%  risk for postoperative MACE   JERMAIN: .01% risk for postoperative MACE      Pulmonary:    Hx of asthma- good control    Preoperative deep breathing educational handout provided to patient.  ARISCAT:  23    points which is a low (1.6%) risk of in-hospital post-op pulmonary complications   STOP BAN  points which is a low risk for moderate to severe NOMI    Renal: no significant findings on chart review or clinical presentation    Endocrine:  see surgical plan    Hematologic:   no significant findings on chart review or clinical presentation  Preoperative DVT educational handout provided to patient.  Caprini Score:  5  points which is a high risk of perioperative VTE    Gastrointestinal:     Hx of GERD under good control  Apfel: 3 points 61% risk for post operative N/V    Infectious  disease:  no significant findings on chart review or clinical presentation     Musculoskeletal:  hx of fibromyalgia  PT for right hip discomfort    Neuro:    No neurologic diagnoses  Preoperative brain exercise educational handout provided to patient.  The patient is at an increased risk for perioperative stroke secondary to female sex  and general anesthesia.

## 2025-02-05 NOTE — CPM/PAT H&P
CPM/PAT Evaluation       Name: Stephon Smith (Stephon Smith)  /Age: 1980/44 y.o.     In-Person       Chief Complaint: thyroid nodules    HPI  This is a very pleasant 44-year-old with a past medical history of fibromyalgia, anxiety, asthma, GERD, and thyroid mass.  Patient states that she has had previous biopsies on the thyroid nodules in  and .  Over the last year the mass and nodules have increased in size and she has had an abnormal ultrasound.    Patient reports mass is affecting her swallowing.   She denies recent fever or chills.  Plan is for left thyroidectomy with Dr. Chandler on .    Past Medical History:   Diagnosis Date    Anxiety     Arthritis     Asthma     Disease of thyroid gland     Dizziness     Fibromyalgia, primary     GERD (gastroesophageal reflux disease)     Goiter     Joint pain     Lumbar disc disease     PONV (postoperative nausea and vomiting)     Superior glenoid labrum lesion of unspecified shoulder, initial encounter     Superior labrum anterior-to-posterior (SLAP) tear of shoulder    Unspecified lump in the right breast, unspecified quadrant 2013    Mass of right breast       Past Surgical History:   Procedure Laterality Date    DILATION AND CURETTAGE OF UTERUS  2013    Dilation And Curettage    SHOULDER SURGERY         Patient  has no history on file for sexual activity.    Family History   Problem Relation Name Age of Onset    Breast cancer Mother  63    Breast cancer Maternal Grandmother  42    Breast cancer Maternal Great-Grandmother  74       Allergies   Allergen Reactions    Azithromycin Hives     itchy pimples on chest and back after finishing course of abx    Bupropion Hives    Cefdinir Hives     dizziness    Erythromycin Hives     itchy pimples on chest and back after finishing course of abx    Topiramate Other     confusion/altered mental state    Doxycycline Hives and Rash    Latex Hives, Itching and Rash    Minocycline Hives and Rash     Penicillins Hives and Rash     itchy pimples on chest and back after finishing course of abx       Prior to Admission medications    Medication Sig Start Date End Date Taking? Authorizing Provider   albuterol 2.5 mg /3 mL (0.083 %) nebulizer solution Take 3 mL (2.5 mg) by nebulization 4 times a day as needed for wheezing or shortness of breath. 5/12/23 1/30/25  Marga Johnson DO   albuterol 90 mcg/actuation inhaler Inhale 2 puffs every 4 hours if needed. 9/11/19   Historical Provider, MD   ALPRAZolam (Xanax) 0.5 mg tablet Take 1 tablet (0.5 mg) by mouth 3 times a day as needed for anxiety for up to 6 days. 7/9/24 1/30/25  Marga Johnson DO   cyclobenzaprine (Flexeril) 10 mg tablet Take 1 tablet (10 mg) by mouth 2 times a day as needed. 2/10/21   Historical Provider, MD   fluticasone (Flonase) 50 mcg/actuation nasal spray Administer 2 sprays into affected nostril(s) once daily. 5/1/19   Historical Provider, MD   levonorgestrel (Mirena) 21 mcg/24 hours (8 yrs) 52 mg IUD by intrauterine route. 11/24/21   Historical Provider, MD   loratadine (Claritin) 10 mg tablet Take 1 tablet (10 mg) by mouth once daily.    Historical Provider, MD   magnesium oxide (Mag-Ox) 400 mg tablet Take by mouth. 2/10/21   Historical Provider, MD   milnacipran (Savella) 12.5 mg tablet Take 1 tablet (12.5 mg) by mouth once daily. 12/31/24   Marga Johnson DO   potassium gluconate 550 mg (90 mg) tablet Take by mouth.    Historical Provider, MD   pregabalin (Lyrica) 50 mg capsule Take 1 capsule (50 mg) by mouth 2 times a day. 12/9/24 12/9/25  Marga Johnson DO   promethazine (Phenergan) 25 mg tablet Take 1 tablet (25 mg) by mouth every 6 hours if needed for vomiting or nausea. 7/31/23   Marga Johnson DO   SUMAtriptan (Imitrex) 50 mg tablet Take 1 tablet (50 mg) by mouth 1 time if needed for migraine. 8/29/19   Historical Provider, MD LORD ROS:   Constitutional:   neg    Neuro/Psych:    Hx of anxiety- sees  counselor  Eyes:   neg    Ears:   neg    Nose:   neg    Mouth:   neg    Throat:   neg    Neck:      No carotid bruits auscultated  neg    Cardio:    Hx of syncope   Full negative cardiac workup  Respiratory:    Hx of asthma- -good control  Endocrine:    See HPI  GI:    Hx of GERD- had taken PPI in past good control  :   neg    Musculoskeletal:    Hx of fibromyalgia     patient currently is undergoing physical therapy for right hip and thigh discomfort  Hematologic:   neg    Skin:  neg        Physical Exam  Constitutional:       Appearance: Normal appearance.   HENT:      Head: Normocephalic and atraumatic.      Nose: Nose normal.      Mouth/Throat:      Mouth: Mucous membranes are moist.   Eyes:      Pupils: Pupils are equal, round, and reactive to light.   Neck:      Comments:   No carotid bruits auscultated  Cardiovascular:      Rate and Rhythm: Normal rate and regular rhythm.   Pulmonary:      Effort: Pulmonary effort is normal.      Breath sounds: Normal breath sounds.   Abdominal:      General: Bowel sounds are normal.      Palpations: Abdomen is soft.   Musculoskeletal:      Cervical back: Normal range of motion.      Comments:  no lower extremity ankle edema   Skin:     General: Skin is warm and dry.   Neurological:      General: No focal deficit present.      Mental Status: She is alert and oriented to person, place, and time.   Psychiatric:         Mood and Affect: Mood normal.         Behavior: Behavior normal.      Airway:nl neck ROM  Anesthesia: Hx of PONV  Teeth: partial    Testing/Diagnostic:   Recent Results (from the past 4 weeks)   CBC    Collection Time: 01/14/25 11:05 AM   Result Value Ref Range    WBC 5.3 4.4 - 11.3 x10*3/uL    nRBC 0.0 0.0 - 0.0 /100 WBCs    RBC 4.87 4.00 - 5.20 x10*6/uL    Hemoglobin 14.3 12.0 - 16.0 g/dL    Hematocrit 42.6 36.0 - 46.0 %    MCV 88 80 - 100 fL    MCH 29.4 26.0 - 34.0 pg    MCHC 33.6 32.0 - 36.0 g/dL    RDW 12.3 11.5 - 14.5 %    Platelets 286 150 - 450 x10*3/uL  "  Comprehensive Metabolic Panel    Collection Time: 01/14/25 11:05 AM   Result Value Ref Range    Glucose 100 (H) 74 - 99 mg/dL    Sodium 140 136 - 145 mmol/L    Potassium 4.7 3.5 - 5.3 mmol/L    Chloride 106 98 - 107 mmol/L    Bicarbonate 29 21 - 32 mmol/L    Anion Gap 10 10 - 20 mmol/L    Urea Nitrogen 12 6 - 23 mg/dL    Creatinine 0.82 0.50 - 1.05 mg/dL    eGFR >90 >60 mL/min/1.73m*2    Calcium 9.5 8.6 - 10.3 mg/dL    Albumin 4.5 3.4 - 5.0 g/dL    Alkaline Phosphatase 42 33 - 110 U/L    Total Protein 6.5 6.4 - 8.2 g/dL    AST 16 9 - 39 U/L    Bilirubin, Total 0.7 0.0 - 1.2 mg/dL    ALT 20 7 - 45 U/L     Patient Specialist/PCP:     Visit Vitals  /62   Pulse 90   Temp 36.5 °C (97.7 °F) (Temporal)   Resp 14   Ht 1.676 m (5' 6\")   Wt 74.8 kg (164 lb 14.5 oz)   SpO2 96%   BMI 26.62 kg/m²   OB Status Implant   Smoking Status Never   BSA 1.87 m²       DASI Risk Score      Flowsheet Row Pre-Admission Testing from 2/6/2025 in [facility]   Can you take care of yourself (eat, dress, bathe, or use toilet)?  2.75 filed at 02/05/2025 1209   Can you walk indoors, such as around your house? 1.75 filed at 02/05/2025 1209   Can you walk a block or two on level ground?  2.75 filed at 02/05/2025 1209   Can you climb a flight of stairs or walk up a hill? 5.5 filed at 02/05/2025 1209   Can you run a short distance? 8 filed at 02/05/2025 1209   Can you do light work around the house like dusting or washing dishes? 2.7 filed at 02/05/2025 1209   Can you do moderate work around the house like vacuuming, sweeping floors or carrying groceries? 3.5 filed at 02/05/2025 1209   Can you do heavy work around the house like scrubbing floors or lifting and moving heavy furniture?  8 filed at 02/05/2025 1209   Can you do yard work like raking leaves, weeding or pushing a mower? 4.5 filed at 02/05/2025 1209   Can you have sexual relations? 5.25 filed at 02/05/2025 1209   Can you participate in moderate recreational activities " like golf, bowling, dancing, doubles tennis or throwing a baseball or football? 6 filed at 02/05/2025 1209   Can you participate in strenous sports like swimming, singles tennis, football, basketball, or skiing? 0 filed at 02/05/2025 1209   DASI SCORE 50.7 filed at 02/05/2025 1209   METS Score (Will be calculated only when all the questions are answered) 9 filed at 02/05/2025 1209          Caprini DVT Assessment      Flowsheet Row Pre-Admission Testing from 2/6/2025 in [facility]   Surgical Factors Major surgery planned, lasting over 3 hours filed at 02/05/2025 1205          Modified Frailty Index      Flowsheet Row Pre-Admission Testing from 2/6/2025 in [facility]   Non-independent functional status (problems with dressing, bathing, personal grooming, or cooking) 0 filed at 02/05/2025 1209   History of diabetes mellitus  0 filed at 02/05/2025 1209   History of COPD 0 filed at 02/05/2025 1209   History of CHF No filed at 02/05/2025 1209   History of MI 0 filed at 02/05/2025 1209   History of Percutaneous Coronary Intervention, Cardiac Surgery, or Angina No filed at 02/05/2025 1209   Hypertension requiring the use of medication  0 filed at 02/05/2025 1209   Peripheral vascular disease 0 filed at 02/05/2025 1209   Impaired sensorium (cognitive impairement or loss, clouding, or delirium) 0 filed at 02/05/2025 1209   TIA or CVA withouy residual deficit 0 filed at 02/05/2025 1209   Cerebrovascular accident with deficit 0 filed at 02/05/2025 1209   Modified Frailty Index Calculator 0 filed at 02/05/2025 1209          CHADS2 Stroke Risk  Current as of yesterday        N/A 3 to 100%: High Risk   2 to < 3%: Medium Risk   0 to < 2%: Low Risk     Last Change: N/A          This score determines the patient's risk of having a stroke if the patient has atrial fibrillation.        This score is not applicable to this patient. Components are not calculated.          Revised Cardiac Risk Index       Flowsheet Row Pre-Admission Testing from 2/6/2025 in [facility] [place]   High-Risk Surgery (Intraperitoneal, Intrathoracic,Suprainguinal vascular) 0 filed at 02/05/2025 1209   History of ischemic heart disease (History of MI, History of positive exercuse test, Current chest paint considered due to myocardial ischemia, Use of nitrate therapy, ECG with pathological Q Waves) 0 filed at 02/05/2025 1209   History of congestive heart failure (pulmonary edemia, bilateral rales or S3 gallop, Paroxysmal nocturnal dyspnea, CXR showing pulmonary vascular redistribution) 0 filed at 02/05/2025 1209   History of cerebrovascular disease (Prior TIA or stroke) 0 filed at 02/05/2025 1209   Pre-operative insulin treatment 0 filed at 02/05/2025 1209   Pre-operative creatinine>2 mg/dl 0 filed at 02/05/2025 1209   Revised Cardiac Risk Calculator 0 filed at 02/05/2025 1209          Apfel Simplified Score      Flowsheet Row Pre-Admission Testing from 2/6/2025 in [facility]   Smoking status 1 filed at 02/05/2025 1209   History of motion sickness or PONV  1 filed at 02/05/2025 1209   Use of postoperative opioids 0 filed at 02/05/2025 1209   Gender - Female 1=Yes filed at 02/05/2025 1209   Apfel Simplified Score Calculator 3 filed at 02/05/2025 1209          Risk Analysis Index Results This Encounter         2/5/2025  1209             Do you live in a place other than your own home?: 0    When did you begin living in the place you are currently residing?: Greater than one year ago    Any kidney failure, kidney not working well, or seeing a kidney doctor (nephrologist)? If yes, was this for kidney stones or another problem?: 0 No    Any history of chronic (long-term) congestive heart failure (CHF)?: 0 No    Any shortness of breath when resting?: 0 No    In the past five years, have you been diagnosed with or treated for cancer?: No    During the last 3 months has it become difficult for you to remember things or  organize your thoughts?: 0 No    Have you lost weight of 10 pounds or more in the past 3 months without trying?: 0 No    Do you have any loss of appetitie?: 0 No    Getting Around (Mobility): 0 Can get around without help    Eatin Can plan and prepare own meals    Toiletin Can use toilet without any help    Personal Hygiene (Bathing, Hand Washing, Changing Clothes): 0 Can shower or bathe without any help    LUCAS Cancer History: Patient does not indicate history of cancer    Total Risk Analysis Index Score Without Cancer: 10    Total Risk Analysis Index Score: 10          Stop Bang Score      Flowsheet Row Pre-Admission Testing from 2025 in [facility]   Do you snore loudly? 0 filed at 2025 120   Do you often feel tired or fatigued after your sleep? 0 filed at 2025 1208   Has anyone ever observed you stop breathing in your sleep? 0 filed at 2025 1208   Do you have or are you being treated for high blood pressure? 0 filed at 2025 1208   Recent BMI (Calculated) 26.3 filed at 2025 1208   Is BMI greater than 35 kg/m2? 0=No filed at 2025 1208   Age older than 50 years old? 0=No filed at 2025 1208   Is your neck circumference greater than 17 inches (Male) or 16 inches (Female)? 0 filed at 2025 1208   Gender - Male 0=No filed at 2025 1208   STOP-BANG Total Score 0 filed at 2025 1208          Prodigy: High Risk  Total Score: 0          ARISCAT Score for Postoperative Pulmonary Complications      Flowsheet Row Pre-Admission Testing from 2025 in [facility]   Age Calculated Score 0 filed at 2025 1209   Preoperative SpO2 0 filed at 2025 1209   Respiratory infection in the last month Either upper or lower (i.e., URI, bronchitis, pneumonia), with fever and antibiotic treatment 0 filed at 2025 120   Preoperative anemia (Hgb less than 10 g/dl) 0 filed at 2025 1209   Surgical incision  0 filed at  2025 120   Duration of surgery  23 filed at 2025 120   Emergency Procedure  0 filed at 2025   ARISCAT Total Score  23 filed at 2025 120          Jossue Perioperative Risk for Myocardial Infarction or Cardiac Arrest (JERMAIN)      Flowsheet Row [facility]   Calculated Age Score 0.84 filed at 2025   Functional Status  0 filed at 2025   ASA Class  -5.17 filed at 2025   Creatinine 0 filed at 2025   Type of Procedure  0.18 filed at 2025   JERMAIN Total Score  -9.4 filed at 2025   JERMAIN % 0.01 filed at 2025            Assessment and Plan:   Plan for OR on  for        LEFT ABAD-THYROIDECTOMY W/NERVE MONITOR [03612 (CPT®)] Left General   Left hemithyroidectomy with nerve monitor     Due to thyroid mass  Most recent labs enclosed    HEENT/Airway:  no significant findings on chart review or clinical presentation    Cardiovascular:  no significant findings on chart review or clinical presentation  DASI  [employer] Activity Status Index (DASI)  DASI Score: 50.7   MET Score: 9  RCRI: 0 points, 3.9%  risk for postoperative MACE   JERMAIN: .01% risk for postoperative MACE      Pulmonary:    Hx of asthma- good control    Preoperative deep breathing educational handout provided to patient.  ARISCAT:  23    points which is a low (1.6%) risk of in-hospital post-op pulmonary complications   STOP BAN  points which is a low risk for moderate to severe NOMI    Renal: no significant findings on chart review or clinical presentation    Endocrine:  see surgical plan    Hematologic:   no significant findings on chart review or clinical presentation  Preoperative DVT educational handout provided to patient.  Caprini Score:  5  points which is a high risk of perioperative VTE    Gastrointestinal:     Hx of GERD under good control  Apfel: 3 points 61% risk for post operative N/V    Infectious  disease:  no significant findings on chart review or clinical presentation     Musculoskeletal:  hx of fibromyalgia  PT for right hip discomfort    Neuro:    No neurologic diagnoses  Preoperative brain exercise educational handout provided to patient.  The patient is at an increased risk for perioperative stroke secondary to female sex  and general anesthesia.

## 2025-02-06 ENCOUNTER — PRE-ADMISSION TESTING (OUTPATIENT)
Dept: PREADMISSION TESTING | Facility: HOSPITAL | Age: 45
End: 2025-02-06
Payer: COMMERCIAL

## 2025-02-06 VITALS
OXYGEN SATURATION: 96 % | DIASTOLIC BLOOD PRESSURE: 62 MMHG | HEIGHT: 66 IN | HEART RATE: 90 BPM | WEIGHT: 164.9 LBS | RESPIRATION RATE: 14 BRPM | TEMPERATURE: 97.7 F | SYSTOLIC BLOOD PRESSURE: 112 MMHG | BODY MASS INDEX: 26.5 KG/M2

## 2025-02-06 DIAGNOSIS — E07.9 THYROID MASS: ICD-10-CM

## 2025-02-06 DIAGNOSIS — Z01.818 PRE-OP TESTING: Primary | ICD-10-CM

## 2025-02-06 PROCEDURE — 99202 OFFICE O/P NEW SF 15 MIN: CPT | Performed by: NURSE PRACTITIONER

## 2025-02-06 ASSESSMENT — PAIN SCALES - GENERAL: PAINLEVEL_OUTOF10: 0 - NO PAIN

## 2025-02-06 ASSESSMENT — ENCOUNTER SYMPTOMS
CONSTITUTIONAL NEGATIVE: 1
NECK NEGATIVE: 1
ENDOCRINE COMMENTS: SEE HPI
EYES NEGATIVE: 1

## 2025-02-06 ASSESSMENT — PAIN - FUNCTIONAL ASSESSMENT: PAIN_FUNCTIONAL_ASSESSMENT: 0-10

## 2025-02-06 NOTE — PREPROCEDURE INSTRUCTIONS
Thank you for visiting Preadmission Testing at [facility]. If you have any changes to your health condition, please call the SURGEON's office to alert them and give them details of your symptoms.        Preoperative Brain Exercises    What are brain exercises?  A brain exercise is any activity that engages your thinking (cognitive) skills.    What types of activities are considered brain exercises?  Jigsaw puzzles, crossword puzzles, word jumble, memory games, word search, and many more.  Many can be found free online or on your phone via a mobile vika.    Why should I do brain exercises before my surgery?  More recent research has shown brain exercise before surgery can lower the risk of postoperative delirium (confusion) which can be especially important for older adults.  Patients who did brain exercises for 5 to 10 hours the days before surgery, cut their risk of postoperative delirium in half up to 1 week after surgery.      Preoperative Deep Breathing Exercises    Why it is important to do deep breathing exercises before my surgery?  Deep breathing exercises strengthen your breathing muscles.  This helps you to recover after your surgery and decreases the chance of breathing complications.    How are the deep breathing exercises done?  Sit straight with your back supported.  Breathe in deeply and slowly through your nose. Your lower rib cage should expand and your abdomen may move forward.  Hold that breath for 3 to 5 seconds.  Breathe out through pursed lips, slowly and completely.  Rest and repeat 10 times every hour while awake.  Rest longer if you become dizzy or lightheaded.      Patient and Family Education   Ways You Can Help Prevent Blood Clots     This handout explains some simple things you can do to help prevent blood clots.      Blood clots are blockages that can form in the body's veins. When a blood clot forms in your deep veins, it may be called a deep vein thrombosis, or DVT for short. Blood clots can  happen in any part of the body where blood flows, but they are most common in the arms and legs. If a piece of a blood clot breaks free and travels to the lungs, it is called a pulmonary embolus (PE). A PE can be a very serious problem.      Being in the hospital or having surgery can raise your chances of getting a blood clot because you may not be well enough to move around as much as you normally do.      Ways you can help prevent blood clots in the hospital         Wearing SCDs. SCDs stands for Sequential Compression Devices.   SCDs are special sleeves that wrap around your legs  They attach to a pump that fills them with air to gently squeeze your legs every few minutes.   This helps return the blood in your legs to your heart.   SCDs should only be taken off when walking or bathing.   SCDs may not be comfortable, but they can help save your life.               Wearing compression stockings - if your doctor orders them. These special snug fitting stockings gently squeeze your legs to help blood flow.       Walking. Walking helps move the blood in your legs.   If your doctor says it is ok, try walking the halls at least   5 times a day. Ask us to help you get up, so you don't fall.      Taking any blood thinning medicines your doctor orders.          ©[facility]; 3/23        Ways you can help prevent blood clots at home       Wearing compression stockings - if your doctor orders them. ? Walking - to help move the blood in your legs.       Taking any blood thinning medicines your doctor orders.      Signs of a blood clot or PE      Tell your doctor or nurse know right away if you have of the problems listed below.    If you are at home, seek medical care right away. Call 911 for chest pain or problems breathing.          Signs of a blood clot (DVT) - such as pain,  swelling, redness or warmth in your arm or leg      Signs of a pulmonary embolism (PE) - such as chest     pain or feeling short of breath

## 2025-02-06 NOTE — PREPROCEDURE INSTRUCTIONS
Medication List            Accurate as of February 6, 2025  9:53 AM. Always use your most recent med list.                * albuterol 90 mcg/actuation inhaler  Medication Adjustments for Surgery: Take/Use as prescribed     * albuterol 2.5 mg /3 mL (0.083 %) nebulizer solution  Take 3 mL (2.5 mg) by nebulization 4 times a day as needed for wheezing or shortness of breath.  Medication Adjustments for Surgery: Take/Use as prescribed     ALPRAZolam 0.5 mg tablet  Commonly known as: Xanax  Take 1 tablet (0.5 mg) by mouth 3 times a day as needed for anxiety for up to 6 days.  Medication Adjustments for Surgery: Take/Use as prescribed     cyclobenzaprine 10 mg tablet  Commonly known as: Flexeril  Medication Adjustments for Surgery: Take/Use as prescribed     fluticasone 50 mcg/actuation nasal spray  Commonly known as: Flonase  Medication Adjustments for Surgery: Take/Use as prescribed     loratadine 10 mg tablet  Commonly known as: Claritin  Medication Adjustments for Surgery: Take/Use as prescribed     magnesium oxide 400 mg tablet  Commonly known as: Mag-Ox  Medication Adjustments for Surgery: Do Not take on the morning of surgery     milnacipran 12.5 mg tablet  Commonly known as: Savella  Take 1 tablet (12.5 mg) by mouth once daily.  Medication Adjustments for Surgery: Take/Use as prescribed     Mirena 20 mcg/24hr IUD  Generic drug: levonorgestrel     potassium gluconate 550 mg (90 mg) tablet  Medication Adjustments for Surgery: Do Not take on the morning of surgery     pregabalin 50 mg capsule  Commonly known as: Lyrica  Take 1 capsule (50 mg) by mouth 2 times a day.  Medication Adjustments for Surgery: Take/Use as prescribed     promethazine 25 mg tablet  Commonly known as: Phenergan  Take 1 tablet (25 mg) by mouth every 6 hours if needed for vomiting or nausea.  Medication Adjustments for Surgery: Take/Use as prescribed     SUMAtriptan 50 mg tablet  Commonly known as: Imitrex  Medication Adjustments for Surgery:  Do Not take on the morning of surgery           * This list has 2 medication(s) that are the same as other medications prescribed for you. Read the directions carefully, and ask your doctor or other care provider to review them with you.                      PRE-OPERATIVE INSTRUCTIONS    You will receive notification one business day prior to your procedure to confirm your arrival time. It is important that you answer your phone and/or check your messages during this time. If you do not hear from the surgery center by 5 pm. the day before your procedure, please call [phone].     Please enter the building through the Outpatient entrance and take the elevator off the lobby to the 2nd floor then check in at the Outpatient Surgery desk on the 2nd floor.    INSTRUCTIONS:  Talk to your surgeon for instructions if you should stop your aspirin, blood thinner, or diabetes medicines.  DO NOT take any multivitamins or over the counter supplements for 7-10 days before surgery.  If not being admitted, you must have an adult immediately available to drive you home after surgery. We also highly recommend you have someone stay with you for the entire day and night of your surgery.  For children having surgery, a parent or legal guardian must accompany them to the surgery center. If this is not possible, please call [phone] to make additional arrangements.  For adults who are unable to consent or make medical decisions for themselves, a legal guardian or Power of  must accompany them to the surgery center. If this is not possible, please call [phone] to make additional arrangements.  Wear comfortable, loose fitting clothing.  All jewelry and piercings must be removed. If you are unable to remove an item or have a dermal piercing, please be sure to tell the nurse when you arrive for surgery.  Nail polish and make-up must be removed.  Avoid smoking or consuming alcohol for 24 hours before surgery.  To help  prevent infection, please take a shower/bath and wash your hair the night before and/or morning of surgery (or follow other specific bathing instructions provided).    Preoperative Fasting Guidelines    Why must I stop eating and drinking near surgery time?  With sedation, food or liquid in your stomach can enter your lungs causing serious complications  Increases nausea and vomiting    When do I need to stop eating and drinking before my surgery?  Do not eat any solid food after midnight the night before your surgery/procedure unless otherwise instructed by your surgeon.   You may have up to 13.5 ounces of clear liquid until TWO hours before your instructed arrival time to the hospital.  This includes water, black tea/coffee, (no milk or cream) apple juice, and electrolyte drinks (Gatorade).   You may chew gum until TWO hours before your surgery/procedure      If applicable, notify your surgeons office immediately of any new skin changes that occur to the surgical limb.      If you have any questions or concerns, please call Pre-Admission Testing at [phone].       Home Preoperative Antibacterial Shower with Chlorhexidine gluconate (CHG)     What is a home preoperative antibacterial shower?  This shower is a way of cleaning the skin with a germ killing solution before surgery. The solution contains chlorhexidine gluconate, commonly known as CHG. CHG is a skin cleanser with germ killing ability. Let your doctor know if you are allergic to chlorhexidine.    Why do I need to take a preoperative antibacterial shower?  Skin is not sterile. It is best to try to make your skin as free of germs as possible before surgery. Proper cleansing with a germ killing soap before surgery can lower the number of germs on your skin. This helps to reduce the risk of infection at the surgical site. Following the instructions listed below will help you prepare your skin for surgery.    How do I use the solution?  Begin using your  CHG soap the night before and again the morning of your procedure.   Do not shave the day before or day of surgery.  Remove all jewelry until after surgery. Take off rings and take out all body-piercing jewelry.  Wash your face and hair with normal soap and shampoo before you use the CHG soap.  Apply the CHG solution to a clean wet washcloth. Move away from the water to avoid premature rinsing of the CHG soap as you are applying. Firmly lather your entire body from the neck down. Do not use CHG on your face, eyes, ears, or genitals.   Pay special attention to the area where your incisions will be located.  Do not scrub your skin too hard.  It is important to allow the CHG soap to sit on your skin for 3-5 minutes.  Rinse the solution off your body completely. Do not wash with your normal soap after the CHG soap solution.  Pat yourself dry with a clean, soft towel.  Do not apply powders, lotions or deodorants as these might block how the CHG soap works.   Dress in clean clothing.  Be sure to sleep with clean, freshly laundered sheets.  Be aware that CHG can cause stains on fabric. Rinse your washcloth and other linens that have contact with CHG completely. Use only non-chlorine detergents to launder the items used.

## 2025-02-07 LAB
T3FREE SERPL-MCNC: 3.5 PG/ML (ref 2.3–4.2)
T4 FREE SERPL-MCNC: 1.3 NG/DL (ref 0.8–1.8)
TSH SERPL-ACNC: 0.44 MIU/L

## 2025-02-18 ENCOUNTER — ANESTHESIA EVENT (OUTPATIENT)
Dept: OPERATING ROOM | Facility: HOSPITAL | Age: 45
End: 2025-02-18
Payer: COMMERCIAL

## 2025-02-19 ENCOUNTER — ANESTHESIA (OUTPATIENT)
Dept: OPERATING ROOM | Facility: HOSPITAL | Age: 45
End: 2025-02-19
Payer: COMMERCIAL

## 2025-02-19 ENCOUNTER — HOSPITAL ENCOUNTER (OUTPATIENT)
Facility: HOSPITAL | Age: 45
LOS: 1 days | Discharge: HOME | End: 2025-02-20
Attending: OTOLARYNGOLOGY | Admitting: OTOLARYNGOLOGY
Payer: COMMERCIAL

## 2025-02-19 DIAGNOSIS — E07.9 THYROID MASS: ICD-10-CM

## 2025-02-19 DIAGNOSIS — Z01.818 PRE-OP TESTING: ICD-10-CM

## 2025-02-19 DIAGNOSIS — E04.9 GOITER: Primary | ICD-10-CM

## 2025-02-19 DIAGNOSIS — G89.18 ACUTE POSTOPERATIVE PAIN: ICD-10-CM

## 2025-02-19 LAB — HCG UR QL IA.RAPID: NEGATIVE

## 2025-02-19 PROCEDURE — 2500000004 HC RX 250 GENERAL PHARMACY W/ HCPCS (ALT 636 FOR OP/ED): Performed by: ANESTHESIOLOGY

## 2025-02-19 PROCEDURE — 7100000001 HC RECOVERY ROOM TIME - INITIAL BASE CHARGE: Performed by: OTOLARYNGOLOGY

## 2025-02-19 PROCEDURE — 2500000001 HC RX 250 WO HCPCS SELF ADMINISTERED DRUGS (ALT 637 FOR MEDICARE OP): Performed by: OTOLARYNGOLOGY

## 2025-02-19 PROCEDURE — 3600000009 HC OR TIME - EACH INCREMENTAL 1 MINUTE - PROCEDURE LEVEL FOUR: Performed by: OTOLARYNGOLOGY

## 2025-02-19 PROCEDURE — 7100000002 HC RECOVERY ROOM TIME - EACH INCREMENTAL 1 MINUTE: Performed by: OTOLARYNGOLOGY

## 2025-02-19 PROCEDURE — 2500000001 HC RX 250 WO HCPCS SELF ADMINISTERED DRUGS (ALT 637 FOR MEDICARE OP)

## 2025-02-19 PROCEDURE — 7100000011 HC EXTENDED STAY RECOVERY HOURLY - NURSING UNIT

## 2025-02-19 PROCEDURE — 2720000007 HC OR 272 NO HCPCS: Performed by: OTOLARYNGOLOGY

## 2025-02-19 PROCEDURE — 81025 URINE PREGNANCY TEST: CPT | Performed by: OTOLARYNGOLOGY

## 2025-02-19 PROCEDURE — 0754T DGTZ GLS MCRSCP SLD LEVEL V: CPT | Mod: TC,STJLAB,WESLAB | Performed by: OTOLARYNGOLOGY

## 2025-02-19 PROCEDURE — 2500000004 HC RX 250 GENERAL PHARMACY W/ HCPCS (ALT 636 FOR OP/ED): Performed by: OTOLARYNGOLOGY

## 2025-02-19 PROCEDURE — 2500000002 HC RX 250 W HCPCS SELF ADMINISTERED DRUGS (ALT 637 FOR MEDICARE OP, ALT 636 FOR OP/ED): Performed by: ANESTHESIOLOGY

## 2025-02-19 PROCEDURE — 60220 PARTIAL REMOVAL OF THYROID: CPT | Performed by: OTOLARYNGOLOGY

## 2025-02-19 PROCEDURE — 3700000001 HC GENERAL ANESTHESIA TIME - INITIAL BASE CHARGE: Performed by: OTOLARYNGOLOGY

## 2025-02-19 PROCEDURE — 96372 THER/PROPH/DIAG INJ SC/IM: CPT | Performed by: OTOLARYNGOLOGY

## 2025-02-19 PROCEDURE — 3600000004 HC OR TIME - INITIAL BASE CHARGE - PROCEDURE LEVEL FOUR: Performed by: OTOLARYNGOLOGY

## 2025-02-19 PROCEDURE — 3700000002 HC GENERAL ANESTHESIA TIME - EACH INCREMENTAL 1 MINUTE: Performed by: OTOLARYNGOLOGY

## 2025-02-19 PROCEDURE — 2500000004 HC RX 250 GENERAL PHARMACY W/ HCPCS (ALT 636 FOR OP/ED)

## 2025-02-19 RX ORDER — HYDRALAZINE HYDROCHLORIDE 20 MG/ML
5 INJECTION INTRAMUSCULAR; INTRAVENOUS EVERY 30 MIN PRN
Status: DISCONTINUED | OUTPATIENT
Start: 2025-02-19 | End: 2025-02-19 | Stop reason: HOSPADM

## 2025-02-19 RX ORDER — MIDAZOLAM HYDROCHLORIDE 1 MG/ML
INJECTION, SOLUTION INTRAMUSCULAR; INTRAVENOUS AS NEEDED
Status: DISCONTINUED | OUTPATIENT
Start: 2025-02-19 | End: 2025-02-19

## 2025-02-19 RX ORDER — ALBUTEROL SULFATE 90 UG/1
INHALANT RESPIRATORY (INHALATION) AS NEEDED
Status: DISCONTINUED | OUTPATIENT
Start: 2025-02-19 | End: 2025-02-19

## 2025-02-19 RX ORDER — ONDANSETRON HYDROCHLORIDE 2 MG/ML
4 INJECTION, SOLUTION INTRAVENOUS EVERY 8 HOURS PRN
Status: DISCONTINUED | OUTPATIENT
Start: 2025-02-19 | End: 2025-02-20 | Stop reason: HOSPADM

## 2025-02-19 RX ORDER — MIDAZOLAM HYDROCHLORIDE 1 MG/ML
1 INJECTION, SOLUTION INTRAMUSCULAR; INTRAVENOUS ONCE AS NEEDED
Status: DISCONTINUED | OUTPATIENT
Start: 2025-02-19 | End: 2025-02-19 | Stop reason: HOSPADM

## 2025-02-19 RX ORDER — PROMETHAZINE HYDROCHLORIDE 25 MG/1
25 TABLET ORAL EVERY 6 HOURS PRN
Status: DISCONTINUED | OUTPATIENT
Start: 2025-02-19 | End: 2025-02-20 | Stop reason: HOSPADM

## 2025-02-19 RX ORDER — SCOPOLAMINE 1 MG/3D
1 PATCH, EXTENDED RELEASE TRANSDERMAL
Status: DISCONTINUED | OUTPATIENT
Start: 2025-02-19 | End: 2025-02-20 | Stop reason: HOSPADM

## 2025-02-19 RX ORDER — ALBUTEROL SULFATE 0.83 MG/ML
2.5 SOLUTION RESPIRATORY (INHALATION) ONCE AS NEEDED
Status: DISCONTINUED | OUTPATIENT
Start: 2025-02-19 | End: 2025-02-19 | Stop reason: HOSPADM

## 2025-02-19 RX ORDER — DIPHENHYDRAMINE HYDROCHLORIDE 50 MG/ML
INJECTION INTRAMUSCULAR; INTRAVENOUS AS NEEDED
Status: DISCONTINUED | OUTPATIENT
Start: 2025-02-19 | End: 2025-02-19

## 2025-02-19 RX ORDER — DIPHENHYDRAMINE HYDROCHLORIDE 50 MG/ML
12.5 INJECTION INTRAMUSCULAR; INTRAVENOUS ONCE AS NEEDED
Status: DISCONTINUED | OUTPATIENT
Start: 2025-02-19 | End: 2025-02-19 | Stop reason: HOSPADM

## 2025-02-19 RX ORDER — FENTANYL CITRATE 50 UG/ML
INJECTION, SOLUTION INTRAMUSCULAR; INTRAVENOUS AS NEEDED
Status: DISCONTINUED | OUTPATIENT
Start: 2025-02-19 | End: 2025-02-19

## 2025-02-19 RX ORDER — APREPITANT 40 MG/1
40 CAPSULE ORAL DAILY
Status: DISCONTINUED | OUTPATIENT
Start: 2025-02-19 | End: 2025-02-19

## 2025-02-19 RX ORDER — CEFAZOLIN SODIUM 2 G/100ML
INJECTION, SOLUTION INTRAVENOUS AS NEEDED
Status: DISCONTINUED | OUTPATIENT
Start: 2025-02-19 | End: 2025-02-19

## 2025-02-19 RX ORDER — HYDROCODONE BITARTRATE AND ACETAMINOPHEN 5; 325 MG/1; MG/1
1 TABLET ORAL EVERY 6 HOURS PRN
Status: DISCONTINUED | OUTPATIENT
Start: 2025-02-19 | End: 2025-02-19

## 2025-02-19 RX ORDER — LIDOCAINE HYDROCHLORIDE AND EPINEPHRINE 10; 10 UG/ML; MG/ML
INJECTION, SOLUTION INFILTRATION; PERINEURAL AS NEEDED
Status: DISCONTINUED | OUTPATIENT
Start: 2025-02-19 | End: 2025-02-19 | Stop reason: HOSPADM

## 2025-02-19 RX ORDER — IBUPROFEN 600 MG/1
600 TABLET ORAL EVERY 8 HOURS PRN
Status: DISCONTINUED | OUTPATIENT
Start: 2025-02-19 | End: 2025-02-20 | Stop reason: HOSPADM

## 2025-02-19 RX ORDER — ACETAMINOPHEN 160 MG/5ML
1000 SOLUTION ORAL EVERY 8 HOURS SCHEDULED
Status: DISCONTINUED | OUTPATIENT
Start: 2025-02-19 | End: 2025-02-20 | Stop reason: HOSPADM

## 2025-02-19 RX ORDER — SUCCINYLCHOLINE/SOD CL,ISO/PF 100 MG/5ML
SYRINGE (ML) INTRAVENOUS AS NEEDED
Status: DISCONTINUED | OUTPATIENT
Start: 2025-02-19 | End: 2025-02-19

## 2025-02-19 RX ORDER — FAMOTIDINE 10 MG/ML
INJECTION, SOLUTION INTRAVENOUS AS NEEDED
Status: DISCONTINUED | OUTPATIENT
Start: 2025-02-19 | End: 2025-02-19

## 2025-02-19 RX ORDER — SODIUM CHLORIDE, SODIUM LACTATE, POTASSIUM CHLORIDE, CALCIUM CHLORIDE 600; 310; 30; 20 MG/100ML; MG/100ML; MG/100ML; MG/100ML
100 INJECTION, SOLUTION INTRAVENOUS CONTINUOUS
Status: DISCONTINUED | OUTPATIENT
Start: 2025-02-19 | End: 2025-02-19 | Stop reason: HOSPADM

## 2025-02-19 RX ORDER — ACETAMINOPHEN 325 MG/1
975 TABLET ORAL EVERY 8 HOURS SCHEDULED
Status: DISCONTINUED | OUTPATIENT
Start: 2025-02-19 | End: 2025-02-20 | Stop reason: HOSPADM

## 2025-02-19 RX ORDER — HYDROMORPHONE HYDROCHLORIDE 1 MG/ML
1 INJECTION, SOLUTION INTRAMUSCULAR; INTRAVENOUS; SUBCUTANEOUS EVERY 5 MIN PRN
Status: DISCONTINUED | OUTPATIENT
Start: 2025-02-19 | End: 2025-02-19 | Stop reason: HOSPADM

## 2025-02-19 RX ORDER — LANOLIN ALCOHOL/MO/W.PET/CERES
400 CREAM (GRAM) TOPICAL NIGHTLY
Status: DISCONTINUED | OUTPATIENT
Start: 2025-02-19 | End: 2025-02-20 | Stop reason: HOSPADM

## 2025-02-19 RX ORDER — DOCUSATE SODIUM 100 MG/1
100 CAPSULE, LIQUID FILLED ORAL 2 TIMES DAILY
Status: DISCONTINUED | OUTPATIENT
Start: 2025-02-19 | End: 2025-02-20 | Stop reason: HOSPADM

## 2025-02-19 RX ORDER — OXYCODONE HYDROCHLORIDE 5 MG/1
5 TABLET ORAL EVERY 4 HOURS PRN
Status: DISCONTINUED | OUTPATIENT
Start: 2025-02-19 | End: 2025-02-19 | Stop reason: HOSPADM

## 2025-02-19 RX ORDER — ONDANSETRON 4 MG/1
4 TABLET, ORALLY DISINTEGRATING ORAL EVERY 8 HOURS PRN
Status: DISCONTINUED | OUTPATIENT
Start: 2025-02-19 | End: 2025-02-20 | Stop reason: HOSPADM

## 2025-02-19 RX ORDER — PROPOFOL 10 MG/ML
INJECTION, EMULSION INTRAVENOUS CONTINUOUS PRN
Status: DISCONTINUED | OUTPATIENT
Start: 2025-02-19 | End: 2025-02-19

## 2025-02-19 RX ORDER — MEPERIDINE HYDROCHLORIDE 50 MG/ML
12.5 INJECTION INTRAMUSCULAR; INTRAVENOUS; SUBCUTANEOUS EVERY 10 MIN PRN
Status: DISCONTINUED | OUTPATIENT
Start: 2025-02-19 | End: 2025-02-19 | Stop reason: HOSPADM

## 2025-02-19 RX ORDER — NALOXONE HYDROCHLORIDE 0.4 MG/ML
0.2 INJECTION, SOLUTION INTRAMUSCULAR; INTRAVENOUS; SUBCUTANEOUS EVERY 5 MIN PRN
Status: DISCONTINUED | OUTPATIENT
Start: 2025-02-19 | End: 2025-02-20 | Stop reason: HOSPADM

## 2025-02-19 RX ORDER — LIDOCAINE HYDROCHLORIDE 10 MG/ML
0.1 INJECTION, SOLUTION INFILTRATION; PERINEURAL ONCE
Status: DISCONTINUED | OUTPATIENT
Start: 2025-02-19 | End: 2025-02-19 | Stop reason: HOSPADM

## 2025-02-19 RX ORDER — FENTANYL CITRATE 50 UG/ML
12.5 INJECTION, SOLUTION INTRAMUSCULAR; INTRAVENOUS EVERY 5 MIN PRN
Status: DISCONTINUED | OUTPATIENT
Start: 2025-02-19 | End: 2025-02-19 | Stop reason: HOSPADM

## 2025-02-19 RX ORDER — REMIFENTANIL HYDROCHLORIDE 1 MG/ML
INJECTION, POWDER, LYOPHILIZED, FOR SOLUTION INTRAVENOUS AS NEEDED
Status: DISCONTINUED | OUTPATIENT
Start: 2025-02-19 | End: 2025-02-19

## 2025-02-19 RX ORDER — DEXAMETHASONE SODIUM PHOSPHATE 10 MG/ML
INJECTION INTRAMUSCULAR; INTRAVENOUS AS NEEDED
Status: DISCONTINUED | OUTPATIENT
Start: 2025-02-19 | End: 2025-02-19

## 2025-02-19 RX ORDER — ONDANSETRON HYDROCHLORIDE 2 MG/ML
4 INJECTION, SOLUTION INTRAVENOUS ONCE AS NEEDED
Status: COMPLETED | OUTPATIENT
Start: 2025-02-19 | End: 2025-02-19

## 2025-02-19 RX ORDER — PREGABALIN 50 MG/1
50 CAPSULE ORAL 2 TIMES DAILY
Status: DISCONTINUED | OUTPATIENT
Start: 2025-02-19 | End: 2025-02-20 | Stop reason: HOSPADM

## 2025-02-19 RX ORDER — ACETAMINOPHEN 325 MG/1
975 TABLET ORAL ONCE
Status: DISCONTINUED | OUTPATIENT
Start: 2025-02-19 | End: 2025-02-19 | Stop reason: HOSPADM

## 2025-02-19 RX ORDER — LIDOCAINE HYDROCHLORIDE 20 MG/ML
INJECTION, SOLUTION EPIDURAL; INFILTRATION; INTRACAUDAL; PERINEURAL AS NEEDED
Status: DISCONTINUED | OUTPATIENT
Start: 2025-02-19 | End: 2025-02-19

## 2025-02-19 RX ORDER — PHENYLEPHRINE HCL IN 0.9% NACL 1 MG/10 ML
SYRINGE (ML) INTRAVENOUS AS NEEDED
Status: DISCONTINUED | OUTPATIENT
Start: 2025-02-19 | End: 2025-02-19

## 2025-02-19 RX ORDER — HYDROCODONE BITARTRATE AND ACETAMINOPHEN 5; 325 MG/1; MG/1
1 TABLET ORAL EVERY 4 HOURS PRN
Status: DISCONTINUED | OUTPATIENT
Start: 2025-02-19 | End: 2025-02-20 | Stop reason: HOSPADM

## 2025-02-19 RX ORDER — CYCLOBENZAPRINE HCL 5 MG
5 TABLET ORAL 3 TIMES DAILY PRN
Status: DISCONTINUED | OUTPATIENT
Start: 2025-02-19 | End: 2025-02-20 | Stop reason: HOSPADM

## 2025-02-19 RX ORDER — ENOXAPARIN SODIUM 100 MG/ML
40 INJECTION SUBCUTANEOUS EVERY 24 HOURS
Status: DISCONTINUED | OUTPATIENT
Start: 2025-02-19 | End: 2025-02-20 | Stop reason: HOSPADM

## 2025-02-19 RX ORDER — ONDANSETRON HYDROCHLORIDE 2 MG/ML
INJECTION, SOLUTION INTRAVENOUS AS NEEDED
Status: DISCONTINUED | OUTPATIENT
Start: 2025-02-19 | End: 2025-02-19

## 2025-02-19 RX ADMIN — HYDROCODONE BITARTRATE AND ACETAMINOPHEN 1 TABLET: 5; 325 TABLET ORAL at 18:40

## 2025-02-19 RX ADMIN — SCOLOPAMINE TRANSDERMAL SYSTEM 1 PATCH: 1 PATCH, EXTENDED RELEASE TRANSDERMAL at 07:25

## 2025-02-19 RX ADMIN — IBUPROFEN 600 MG: 600 TABLET, FILM COATED ORAL at 16:21

## 2025-02-19 RX ADMIN — PREGABALIN 50 MG: 50 CAPSULE ORAL at 22:19

## 2025-02-19 RX ADMIN — FAMOTIDINE 20 MG: 10 INJECTION, SOLUTION INTRAVENOUS at 07:45

## 2025-02-19 RX ADMIN — ONDANSETRON 4 MG: 2 INJECTION INTRAMUSCULAR; INTRAVENOUS at 09:05

## 2025-02-19 RX ADMIN — REMIFENTANIL HYDROCHLORIDE 0.1 MCG/KG/MIN: 1 INJECTION, POWDER, LYOPHILIZED, FOR SOLUTION INTRAVENOUS at 07:40

## 2025-02-19 RX ADMIN — LIDOCAINE HYDROCHLORIDE 100 MG: 20 INJECTION, SOLUTION EPIDURAL; INFILTRATION; INTRACAUDAL; PERINEURAL at 07:36

## 2025-02-19 RX ADMIN — Medication 100 MCG: at 09:08

## 2025-02-19 RX ADMIN — CEFAZOLIN SODIUM 2 G: 2 INJECTION, SOLUTION INTRAVENOUS at 07:47

## 2025-02-19 RX ADMIN — PROPOFOL 40 MCG/KG/MIN: 10 INJECTION, EMULSION INTRAVENOUS at 07:45

## 2025-02-19 RX ADMIN — BENZOCAINE AND MENTHOL 1 LOZENGE: 15; 3.6 LOZENGE ORAL at 20:47

## 2025-02-19 RX ADMIN — Medication 100 MCG: at 08:43

## 2025-02-19 RX ADMIN — MIDAZOLAM 2 MG: 1 INJECTION INTRAMUSCULAR; INTRAVENOUS at 07:30

## 2025-02-19 RX ADMIN — Medication 100 MG: at 07:36

## 2025-02-19 RX ADMIN — HYDROMORPHONE HYDROCHLORIDE 0.5 MG: 1 INJECTION, SOLUTION INTRAMUSCULAR; INTRAVENOUS; SUBCUTANEOUS at 09:53

## 2025-02-19 RX ADMIN — CYCLOBENZAPRINE HYDROCHLORIDE 5 MG: 5 TABLET, FILM COATED ORAL at 13:24

## 2025-02-19 RX ADMIN — BENZOCAINE AND MENTHOL 1 LOZENGE: 15; 3.6 LOZENGE ORAL at 16:21

## 2025-02-19 RX ADMIN — HYDROMORPHONE HYDROCHLORIDE 0.5 MG: 1 INJECTION, SOLUTION INTRAMUSCULAR; INTRAVENOUS; SUBCUTANEOUS at 10:06

## 2025-02-19 RX ADMIN — FENTANYL CITRATE 100 MCG: 50 INJECTION, SOLUTION INTRAMUSCULAR; INTRAVENOUS at 07:36

## 2025-02-19 RX ADMIN — ONDANSETRON 4 MG: 2 INJECTION INTRAMUSCULAR; INTRAVENOUS at 09:50

## 2025-02-19 RX ADMIN — PROPOFOL 200 MG: 10 INJECTION, EMULSION INTRAVENOUS at 07:36

## 2025-02-19 RX ADMIN — DEXAMETHASONE SODIUM PHOSPHATE 10 MG: 10 INJECTION, SOLUTION INTRAMUSCULAR; INTRAVENOUS at 07:52

## 2025-02-19 RX ADMIN — APREPITANT 40 MG: 40 CAPSULE ORAL at 07:25

## 2025-02-19 RX ADMIN — HYDROCODONE BITARTRATE AND ACETAMINOPHEN 1 TABLET: 5; 325 TABLET ORAL at 11:36

## 2025-02-19 RX ADMIN — Medication 400 MG: at 22:20

## 2025-02-19 RX ADMIN — ACETAMINOPHEN 975 MG: 325 TABLET ORAL at 22:20

## 2025-02-19 RX ADMIN — DOCUSATE SODIUM 100 MG: 100 CAPSULE, LIQUID FILLED ORAL at 22:20

## 2025-02-19 RX ADMIN — ALBUTEROL SULFATE 2 PUFF: 108 INHALANT RESPIRATORY (INHALATION) at 09:23

## 2025-02-19 RX ADMIN — PROPOFOL 30 MG: 10 INJECTION, EMULSION INTRAVENOUS at 09:22

## 2025-02-19 RX ADMIN — Medication 100 MCG: at 08:53

## 2025-02-19 RX ADMIN — DIPHENHYDRAMINE HYDROCHLORIDE 50 MG: 50 INJECTION, SOLUTION INTRAMUSCULAR; INTRAVENOUS at 07:32

## 2025-02-19 RX ADMIN — SODIUM CHLORIDE, POTASSIUM CHLORIDE, SODIUM LACTATE AND CALCIUM CHLORIDE: 600; 310; 30; 20 INJECTION, SOLUTION INTRAVENOUS at 07:30

## 2025-02-19 RX ADMIN — ENOXAPARIN SODIUM 40 MG: 40 INJECTION SUBCUTANEOUS at 22:19

## 2025-02-19 RX ADMIN — REMIFENTANIL HYDROCHLORIDE 60 MCG: 1 INJECTION, POWDER, LYOPHILIZED, FOR SOLUTION INTRAVENOUS at 07:56

## 2025-02-19 SDOH — SOCIAL STABILITY: SOCIAL INSECURITY: DO YOU FEEL UNSAFE GOING BACK TO THE PLACE WHERE YOU ARE LIVING?: NO

## 2025-02-19 SDOH — SOCIAL STABILITY: SOCIAL INSECURITY: WERE YOU ABLE TO COMPLETE ALL THE BEHAVIORAL HEALTH SCREENINGS?: YES

## 2025-02-19 SDOH — SOCIAL STABILITY: SOCIAL INSECURITY: HAS ANYONE EVER THREATENED TO HURT YOUR FAMILY OR YOUR PETS?: NO

## 2025-02-19 SDOH — SOCIAL STABILITY: SOCIAL INSECURITY: ABUSE: ADULT

## 2025-02-19 SDOH — SOCIAL STABILITY: SOCIAL INSECURITY: WITHIN THE LAST YEAR, HAVE YOU BEEN AFRAID OF YOUR PARTNER OR EX-PARTNER?: NO

## 2025-02-19 SDOH — SOCIAL STABILITY: SOCIAL INSECURITY: DOES ANYONE TRY TO KEEP YOU FROM HAVING/CONTACTING OTHER FRIENDS OR DOING THINGS OUTSIDE YOUR HOME?: NO

## 2025-02-19 SDOH — SOCIAL STABILITY: SOCIAL INSECURITY: HAVE YOU HAD ANY THOUGHTS OF HARMING ANYONE ELSE?: NO

## 2025-02-19 SDOH — SOCIAL STABILITY: SOCIAL INSECURITY: WITHIN THE LAST YEAR, HAVE YOU BEEN HUMILIATED OR EMOTIONALLY ABUSED IN OTHER WAYS BY YOUR PARTNER OR EX-PARTNER?: NO

## 2025-02-19 SDOH — ECONOMIC STABILITY: FOOD INSECURITY: WITHIN THE PAST 12 MONTHS, THE FOOD YOU BOUGHT JUST DIDN'T LAST AND YOU DIDN'T HAVE MONEY TO GET MORE.: NEVER TRUE

## 2025-02-19 SDOH — SOCIAL STABILITY: SOCIAL INSECURITY
WITHIN THE LAST YEAR, HAVE YOU BEEN KICKED, HIT, SLAPPED, OR OTHERWISE PHYSICALLY HURT BY YOUR PARTNER OR EX-PARTNER?: NO

## 2025-02-19 SDOH — ECONOMIC STABILITY: FOOD INSECURITY: WITHIN THE PAST 12 MONTHS, YOU WORRIED THAT YOUR FOOD WOULD RUN OUT BEFORE YOU GOT THE MONEY TO BUY MORE.: NEVER TRUE

## 2025-02-19 SDOH — SOCIAL STABILITY: SOCIAL INSECURITY: ARE THERE ANY APPARENT SIGNS OF INJURIES/BEHAVIORS THAT COULD BE RELATED TO ABUSE/NEGLECT?: NO

## 2025-02-19 SDOH — SOCIAL STABILITY: SOCIAL INSECURITY
ASK PARENT OR GUARDIAN: ARE THERE TIMES WHEN YOU, YOUR CHILD(REN), OR ANY MEMBER OF YOUR HOUSEHOLD FEEL UNSAFE, HARMED, OR THREATENED AROUND PERSONS WITH WHOM YOU KNOW OR LIVE?: NO

## 2025-02-19 SDOH — SOCIAL STABILITY: SOCIAL INSECURITY: HAVE YOU HAD THOUGHTS OF HARMING ANYONE ELSE?: NO

## 2025-02-19 SDOH — SOCIAL STABILITY: SOCIAL INSECURITY: ARE YOU OR HAVE YOU BEEN THREATENED OR ABUSED PHYSICALLY, EMOTIONALLY, OR SEXUALLY BY ANYONE?: NO

## 2025-02-19 SDOH — SOCIAL STABILITY: SOCIAL INSECURITY: DO YOU FEEL ANYONE HAS EXPLOITED OR TAKEN ADVANTAGE OF YOU FINANCIALLY OR OF YOUR PERSONAL PROPERTY?: NO

## 2025-02-19 SDOH — SOCIAL STABILITY: SOCIAL INSECURITY
WITHIN THE LAST YEAR, HAVE YOU BEEN RAPED OR FORCED TO HAVE ANY KIND OF SEXUAL ACTIVITY BY YOUR PARTNER OR EX-PARTNER?: NO

## 2025-02-19 SDOH — ECONOMIC STABILITY: INCOME INSECURITY: IN THE PAST 12 MONTHS HAS THE ELECTRIC, GAS, OIL, OR WATER COMPANY THREATENED TO SHUT OFF SERVICES IN YOUR HOME?: NO

## 2025-02-19 ASSESSMENT — COGNITIVE AND FUNCTIONAL STATUS - GENERAL
DAILY ACTIVITIY SCORE: 24
DAILY ACTIVITIY SCORE: 24
PATIENT BASELINE BEDBOUND: NO
MOBILITY SCORE: 24
MOBILITY SCORE: 24

## 2025-02-19 ASSESSMENT — PAIN - FUNCTIONAL ASSESSMENT
PAIN_FUNCTIONAL_ASSESSMENT: 0-10

## 2025-02-19 ASSESSMENT — PAIN DESCRIPTION - LOCATION
LOCATION: THROAT
LOCATION: NECK

## 2025-02-19 ASSESSMENT — ACTIVITIES OF DAILY LIVING (ADL)
HEARING - RIGHT EAR: FUNCTIONAL
ADEQUATE_TO_COMPLETE_ADL: YES
LACK_OF_TRANSPORTATION: NO
JUDGMENT_ADEQUATE_SAFELY_COMPLETE_DAILY_ACTIVITIES: YES
BATHING: INDEPENDENT
GROOMING: INDEPENDENT
HEARING - LEFT EAR: FUNCTIONAL
PATIENT'S MEMORY ADEQUATE TO SAFELY COMPLETE DAILY ACTIVITIES?: YES
TOILETING: INDEPENDENT
DRESSING YOURSELF: INDEPENDENT
FEEDING YOURSELF: INDEPENDENT
WALKS IN HOME: INDEPENDENT

## 2025-02-19 ASSESSMENT — LIFESTYLE VARIABLES
HOW OFTEN DO YOU HAVE A DRINK CONTAINING ALCOHOL: NEVER
HOW MANY STANDARD DRINKS CONTAINING ALCOHOL DO YOU HAVE ON A TYPICAL DAY: PATIENT DOES NOT DRINK
AUDIT-C TOTAL SCORE: 0
SKIP TO QUESTIONS 9-10: 1
AUDIT-C TOTAL SCORE: 0
HOW OFTEN DO YOU HAVE 6 OR MORE DRINKS ON ONE OCCASION: NEVER

## 2025-02-19 ASSESSMENT — PAIN SCALES - GENERAL
PAINLEVEL_OUTOF10: 6
PAINLEVEL_OUTOF10: 6
PAINLEVEL_OUTOF10: 4
PAINLEVEL_OUTOF10: 3
PAINLEVEL_OUTOF10: 7
PAINLEVEL_OUTOF10: 0 - NO PAIN
PAINLEVEL_OUTOF10: 5 - MODERATE PAIN
PAINLEVEL_OUTOF10: 3
PAINLEVEL_OUTOF10: 6
PAINLEVEL_OUTOF10: 4
PAINLEVEL_OUTOF10: 0 - NO PAIN
PAINLEVEL_OUTOF10: 0 - NO PAIN
PAINLEVEL_OUTOF10: 7
PAINLEVEL_OUTOF10: 6
PAINLEVEL_OUTOF10: 6

## 2025-02-19 ASSESSMENT — PATIENT HEALTH QUESTIONNAIRE - PHQ9
2. FEELING DOWN, DEPRESSED OR HOPELESS: NOT AT ALL
SUM OF ALL RESPONSES TO PHQ9 QUESTIONS 1 & 2: 0
1. LITTLE INTEREST OR PLEASURE IN DOING THINGS: NOT AT ALL

## 2025-02-19 ASSESSMENT — PAIN DESCRIPTION - ORIENTATION
ORIENTATION: ANTERIOR
ORIENTATION: ANTERIOR

## 2025-02-19 ASSESSMENT — PAIN DESCRIPTION - DESCRIPTORS
DESCRIPTORS: ACHING

## 2025-02-19 ASSESSMENT — COLUMBIA-SUICIDE SEVERITY RATING SCALE - C-SSRS
2. HAVE YOU ACTUALLY HAD ANY THOUGHTS OF KILLING YOURSELF?: NO
6. HAVE YOU EVER DONE ANYTHING, STARTED TO DO ANYTHING, OR PREPARED TO DO ANYTHING TO END YOUR LIFE?: NO
1. IN THE PAST MONTH, HAVE YOU WISHED YOU WERE DEAD OR WISHED YOU COULD GO TO SLEEP AND NOT WAKE UP?: NO

## 2025-02-19 ASSESSMENT — PAIN SCALES - PAIN ASSESSMENT IN ADVANCED DEMENTIA (PAINAD): TOTALSCORE: MEDICATION (SEE MAR)

## 2025-02-19 NOTE — ANESTHESIA PROCEDURE NOTES
Airway  Date/Time: 2/19/2025 7:38 AM  Urgency: elective      Staffing  Performed: [facility]   Authorized by: Michael Rojas DO    Performed by: Lynne Hayes RN  Patient location during procedure: OR    Indications and Patient Condition  Indications for airway management: anesthesia  Spontaneous Ventilation: absent  Sedation level: deep  Preoxygenated: yes  Patient position: sniffing  MILS maintained throughout  Mask difficulty assessment: 1 - vent by mask    Final Airway Details  Final airway type: endotracheal airway      Successful airway: NIM tube  Cuffed: yes   Successful intubation technique: video laryngoscopy (coleman)  Endotracheal tube insertion site: oral  Blade size: #3  Cormack-Lehane Classification: grade I - full view of glottis  Placement verified by: chest auscultation and capnometry   Measured from: lips  ETT to lips (cm): 20  Number of attempts at approach: 1    Additional Comments  Surgeon confirmed proper placement for NIMS ETT monitoring

## 2025-02-19 NOTE — ANESTHESIA PREPROCEDURE EVALUATION
Patient: Stephon Smith    Procedure Information       Date/Time: 02/19/25 30    Procedure: LEFT ABAD-THYROIDECTOMY W/NERVE MONITOR (Left) - Left hemithyroidectomy with nerve monitor    Location: [facility] OR 05 / [facility]    Surgeons: Amaury Chandler MD            Relevant Problems   Neuro   (+) Anxiety   (+) Depression      Endocrine   (+) Goiter      Musculoskeletal   (+) Fibromyalgia       Clinical information reviewed:   Tobacco  Allergies  Meds   Med Hx  Surg Hx  OB Status  Fam Hx  Soc   Hx        NPO Detail:  NPO/Void Status  Carbohydrate Drink Given Prior to Surgery? : N  Date of Last Liquid: 02/18/25  Time of Last Liquid: 2200  Date of Last Solid: 02/18/25  Time of Last Solid: 1900  Last Intake Type: Clear fluids  Time of Last Void: 0639         Physical Exam    Airway  Mallampati: II  TM distance: >3 FB  Neck ROM: full     Cardiovascular - normal exam     Dental    Pulmonary - normal exam     Abdominal - normal exam             Anesthesia Plan    History of general anesthesia?: yes  History of complications of general anesthesia?: no    ASA 2     general   (Hx of severe PONV)  intravenous induction   Anesthetic plan and risks discussed with patient.    Plan discussed with CRNA.

## 2025-02-19 NOTE — OP NOTE
LEFT ABAD-THYROIDECTOMY W/NERVE MONITOR (L) Operative Note     Date: 2025  OR Location: [facility] OR    Name: Stephon Smith, : 1980, Age: 44 y.o., MRN: [id number], Sex: female    Diagnosis  Pre-op Diagnosis      * Thyroid mass [E07.9] Post-op Diagnosis     * Thyroid mass [E07.9]     Procedures  LEFT ABAD-THYROIDECTOMY W/NERVE MONITOR  14307 - [place] TOTAL THYROID LOBECTOMY UNI W/WO ISTHMUSECTOMY      Surgeons      * Amaury Chandler - Primary    Resident/Fellow/Other Assistant:  Surgeons and Role:  * No surgeons found with a matching role *    Staff:   Surgical Assistant:   Circulator: Antonette  Circulator: Emilie Oliveros Person: Violeta    Anesthesia Staff: Anesthesiologist: Michael Rojas DO  CRNA: NHI Valverde-AMY  SRNA: Lynne Hayes RN    Procedure Summary  Anesthesia: Anesthesia type not filed in the log.  ASA: II  Estimated Blood Loss: 10 mL  Intra-op Medications:   Administrations occurring from 0730 to 0950 on 25:   Medication Name Total Dose   lidocaine-epinephrine (Xylocaine W/EPI) 1 %-1:100,000 injection 10 mL   albuterol (Proventil, Ventolin, ProAir) inhaler 108 (90 BASE) mcg/act 2 puff   ceFAZolin (Ancef) IV 2 g in 100 mL dextrose (iso) - premix 2 g   dexAMETHasone (Decadron) PF injection 10 mg/mL 10 mg   diphenhydrAMINE (BENADryl) injection 50 mg   famotidine PF (Pepcid) injection 20 mg   fentaNYL (Sublimaze) injection 50 mcg/mL 100 mcg   LR bolus Cannot be calculated   lidocaine PF (Xylocaine-MPF) local injection 2 % 100 mg   midazolam (Versed) injection 1 mg/mL 2 mg   ondansetron (Zofran) 2 mg/mL injection 4 mg   phenylephrine 100 mcg/mL syringe 10 mL (prefilled) 300 mcg   propofol (Diprivan) injection 10 mg/mL 505.21 mg   remifentanil (Ultiva) 1,000 mcg in sodium chloride 0.9% 50 mL (20 mcg/mL) infusion 0.85 mg   succinylcholine PF in sodium chloride IV syringe 100 mg              Anesthesia Record               Intraprocedure I/O Totals          Intake    Remifentanil Drip  0.00 mL    The total shown is the total volume documented since Anesthesia Start was filed.    ceFAZolin 2 gram/100 mL 100.00 mL    Total Intake 100 mL          Specimen:   ID Type Source Tests Collected by Time   1 : LEFT HEMITHYROID, STITCH AT ISTHMUS Tissue THYROID HEMITHYROIDECTOMY LEFT SURGICAL PATHOLOGY EXAM Amaury Chandler MD 2/19/2025 0880                 Drains and/or Catheters:   Closed/Suction Drain Left Neck 10 Fr. (Active)         Findings: Left vagus nerve stimulated at 0.5    Indications: Stephon Smith is an 44 y.o. female who is having surgery for Thyroid mass [E07.9].     The patient was seen in the preoperative area. The risks, benefits, complications, treatment options, non-operative alternatives, expected recovery and outcomes were discussed with the patient. The possibilities of reaction to medication, pulmonary aspiration, injury to surrounding structures, bleeding, recurrent infection, the need for additional procedures, failure to diagnose a condition, and creating a complication requiring transfusion or operation were discussed with the patient. The patient concurred with the proposed plan, giving informed consent.  The site of surgery was properly noted/marked if necessary per policy. The patient has been actively warmed in preoperative area. Preoperative antibiotics have been ordered and given within 1 hours of incision. Venous thrombosis prophylaxis have been ordered including bilateral sequential compression devices    Procedure Details: Patient was taken back to the operating room and laid supine on the table.  Timeout was performed, general anesthesia induced, patient was intubated with Nims endotracheal tube.  The circuit was connected and functioning appropriately.  Patient had a large left-sided goiter therefore an 8 cm margin was marked in the low midline neck to allow for proper visualization.  The neck was then prepped and draped in sterile fashion.  A 15 blade was used to  make a skin incision down into the subcutaneous fat.  Bovie cautery was used to elevate some platysmal flaps superiorly and inferiorly.  An anterior jugular vein was clipped and divided.  The strap muscles were elevated off of the left hemithyroid and divided horizontally with the harmonic.  Next the isthmus was divided with the harmonic and the thyroid gland was elevated away from the trachea.  Next bipolar cautery and blunt dissection was used to dissect around the capsule superiorly and inferiorly completely exposing the left hemithyroid.  The superior vessels were identified, clipped, and divided with the harmonic scalpel.  The middle thyroid vein was also identified clipped and divided with the harmonic scalpel.  There is a significant substernal component.  Therefore I released the thyroid gland from superior to inferior carefully dissecting through Heart's ligament allowing me to retracted away from the laryngotracheal complex.  Small arterial and venous branches were clipped and divided with the harmonic.  I was eventually able to deliver the substernal component outside of the surgical incision and the thyroid was removed.  It was then oriented with a stitch for pathology.  There is no significant bleeding in the surgical bed.  The wound was irrigated and hemostasis was achieved.  A small amount of fibrillar was placed around the tracheoesophageal groove.  A 10 flat fully perforated drain was placed.  3-0 Vicryl was used to reapproximate the strap musculature horizontally.  Next the incision was closed in layers using 3-0 Vicryl for the deep layer and subcuticular 4-0 Monocryl for the skin.  Mastisol and Steri-Strips were placed.  Patient was extubated without issue and taken to PACU in stable condition  Complications:  None; patient tolerated the procedure well.    Disposition: PACU - hemodynamically stable.  Condition: stable         Amaurybright Ascenciohussein  Phone Number: [phone]

## 2025-02-19 NOTE — ANESTHESIA POSTPROCEDURE EVALUATION
Patient: Stephon Smith    Procedure Summary       Date: 02/19/25 Room / Location: [facility] OR 05 / [facility]    Anesthesia Start: 0730 Anesthesia Stop: 0942    Procedure: LEFT ABAD-THYROIDECTOMY W/NERVE MONITOR (Left: Neck) Diagnosis:       Thyroid mass      (Thyroid mass [E07.9])    Surgeons: Amaury Chandler MD Responsible Provider: Michael Rojas DO    Anesthesia Type: general ASA Status: 2            Anesthesia Type: general    Vitals Value Taken Time   /77 02/19/25 0941   Temp 36 °C (96.8 °F) 02/19/25 0941   Pulse 110 02/19/25 0941   Resp 12 02/19/25 0941   SpO2 100 % 02/19/25 0941       Anesthesia Post Evaluation    Patient location during evaluation: PACU  Patient participation: waiting for patient participation  Level of consciousness: responsive to verbal stimuli  Pain management: adequate  Multimodal analgesia pain management approach  Airway patency: patent  Cardiovascular status: acceptable, hemodynamically stable and stable  Respiratory status: acceptable and face mask  Hydration status: acceptable  Postoperative Nausea and Vomiting: none        There were no known notable events for this encounter.

## 2025-02-19 NOTE — CARE PLAN
Problem: Pain - Adult  Goal: Verbalizes/displays adequate comfort level or baseline comfort level  Outcome: Progressing     Problem: Safety - Adult  Goal: Free from fall injury  Outcome: Progressing     Problem: Discharge Planning  Goal: Discharge to home or other facility with appropriate resources  Outcome: Progressing     Problem: Chronic Conditions and Co-morbidities  Goal: Patient's chronic conditions and co-morbidity symptoms are monitored and maintained or improved  Outcome: Progressing     Problem: Nutrition  Goal: Nutrient intake appropriate for maintaining nutritional needs  Outcome: Progressing     Problem: Pain  Goal: Takes deep breaths with improved pain control throughout the shift  Outcome: Progressing

## 2025-02-20 ENCOUNTER — PHARMACY VISIT (OUTPATIENT)
Dept: PHARMACY | Facility: CLINIC | Age: 45
End: 2025-02-20
Payer: COMMERCIAL

## 2025-02-20 VITALS
WEIGHT: 162 LBS | HEIGHT: 66 IN | BODY MASS INDEX: 26.03 KG/M2 | RESPIRATION RATE: 16 BRPM | DIASTOLIC BLOOD PRESSURE: 72 MMHG | SYSTOLIC BLOOD PRESSURE: 118 MMHG | HEART RATE: 98 BPM | OXYGEN SATURATION: 98 % | TEMPERATURE: 97.2 F

## 2025-02-20 PROBLEM — E04.9 GOITER: Status: RESOLVED | Noted: 2025-02-19 | Resolved: 2025-02-20

## 2025-02-20 PROBLEM — E07.9 THYROID MASS: Status: RESOLVED | Noted: 2025-01-14 | Resolved: 2025-02-20

## 2025-02-20 PROCEDURE — 7100000011 HC EXTENDED STAY RECOVERY HOURLY - NURSING UNIT

## 2025-02-20 PROCEDURE — 2500000001 HC RX 250 WO HCPCS SELF ADMINISTERED DRUGS (ALT 637 FOR MEDICARE OP): Performed by: OTOLARYNGOLOGY

## 2025-02-20 PROCEDURE — RXMED WILLOW AMBULATORY MEDICATION CHARGE

## 2025-02-20 RX ORDER — HYDROCODONE BITARTRATE AND ACETAMINOPHEN 5; 325 MG/1; MG/1
1 TABLET ORAL EVERY 4 HOURS PRN
Qty: 18 TABLET | Refills: 0 | Status: SHIPPED | OUTPATIENT
Start: 2025-02-20 | End: 2025-02-23

## 2025-02-20 RX ADMIN — HYDROCODONE BITARTRATE AND ACETAMINOPHEN 1 TABLET: 5; 325 TABLET ORAL at 06:41

## 2025-02-20 RX ADMIN — BENZOCAINE AND MENTHOL 1 LOZENGE: 15; 3.6 LOZENGE ORAL at 04:28

## 2025-02-20 RX ADMIN — PREGABALIN 50 MG: 50 CAPSULE ORAL at 09:15

## 2025-02-20 RX ADMIN — ACETAMINOPHEN 975 MG: 325 TABLET ORAL at 04:28

## 2025-02-20 RX ADMIN — IBUPROFEN 600 MG: 600 TABLET, FILM COATED ORAL at 04:28

## 2025-02-20 RX ADMIN — DOCUSATE SODIUM 100 MG: 100 CAPSULE, LIQUID FILLED ORAL at 09:15

## 2025-02-20 ASSESSMENT — PAIN SCALES - GENERAL: PAINLEVEL_OUTOF10: 7

## 2025-02-20 NOTE — PROGRESS NOTES
"Stephon Smith is a 44 y.o. female on day 1 of admission presenting with Thyroid mass.     Subjective   Patient seen and examined.  No major issues overnight.  Pain under better control.  Was able to get out of bed and ambulate to bathroom without issue.       Objective     Neck flat and soft, voice strong, Steri-Strips in place  Drain output 10 over the last 12 hours-removed    Last Recorded Vitals  Blood pressure 107/68, pulse 101, temperature 37.1 °C (98.8 °F), temperature source Temporal, resp. rate 18, height 1.676 m (5' 6\"), weight 73.5 kg (162 lb), SpO2 97%.  Intake/Output last 3 Shifts:  I/O last 3 completed shifts:  In: 942.4 (12.8 mL/kg) [I.V.:42.4 (0.6 mL/kg); IV Piggyback:900]  Out: 10 (0.1 mL/kg) [Blood:10]  Weight: 73.5 kg     Relevant Results        Scheduled medications  acetaminophen, 975 mg, oral, q8h COSTA   Or  acetaminophen, 1,000 mg, oral, q8h COSTA   Or  acetaminophen, 1,000 mg, g-tube, q8h COSTA  docusate sodium, 100 mg, oral, BID  enoxaparin, 40 mg, subcutaneous, q24h  magnesium oxide, 400 mg, oral, Nightly  pregabalin, 50 mg, oral, BID  scopolamine, 1 patch, transdermal, q72h      Continuous medications     PRN medications  PRN medications: benzocaine-menthol, cyclobenzaprine, HYDROcodone-acetaminophen, ibuprofen, naloxone, ondansetron ODT **OR** ondansetron, promethazine                         Assessment/Plan   Assessment & Plan  Thyroid mass    Goiter    Postop day 1 status post left hemithyroidectomy for enlarged goiter    -Discharge today  -Prescription provided for pain control         I spent 25 minutes in the professional and overall care of this patient.      Amaury Chandler MD      "

## 2025-02-20 NOTE — PROGRESS NOTES
02/20/25 1005   Discharge Planning   Living Arrangements Spouse/significant other   Support Systems Spouse/significant other   Type of Residence Private residence   Home or Post Acute Services None   Expected Discharge Disposition Home   Does the patient need discharge transport arranged? No     Met with patient at bedside. Admitted for removal of thyroid mass. Pt lives with spouse and was independent PTA with no HHC or DME. PCP is Marga Johnson. Pt feels she is able to manage her health and understands her medications. Was able to drive and obtain meds. Pt plans to return home with no new discharge needs. Family will provide transport.

## 2025-02-20 NOTE — DISCHARGE SUMMARY
Discharge Diagnosis  Thyroid mass    Issues Requiring Follow-Up  Final pathology    Test Results Pending At Discharge  Pending Labs       Order Current Status    Surgical Pathology Exam In process            Hospital Course   Admitted overnight for observation.  Pain initially uncontrolled however improved with addition of throat lozenges and ibuprofen.  At time of discharge patient was tolerating p.o. intake, pain was controlled, and she was able to ambulate to bathroom.  Drain was removed prior to discharge    Pertinent Physical Exam At Time of Discharge  Neck flat and soft, voice strong, Steri-Strips in place    Home Medications     Medication List      START taking these medications     HYDROcodone-acetaminophen 5-325 mg tablet; Commonly known as: Norco;   Take 1 tablet by mouth every 4 hours if needed for moderate pain (4 - 6)   for up to 3 days.     CHANGE how you take these medications     milnacipran 12.5 mg tablet; Commonly known as: Savella; Take 1 tablet   (12.5 mg) by mouth once daily.; What changed: when to take this     CONTINUE taking these medications     * albuterol 90 mcg/actuation inhaler   * albuterol 2.5 mg /3 mL (0.083 %) nebulizer solution; Take 3 mL (2.5   mg) by nebulization 4 times a day as needed for wheezing or shortness of   breath.   ALPRAZolam 0.5 mg tablet; Commonly known as: Xanax; Take 1 tablet (0.5   mg) by mouth 3 times a day as needed for anxiety for up to 6 days.   loratadine 10 mg tablet; Commonly known as: Claritin   magnesium oxide 400 mg tablet; Commonly known as: Mag-Ox   Mirena 20 mcg/24hr IUD; Generic drug: levonorgestrel   potassium gluconate 550 mg (90 mg) tablet   pregabalin 50 mg capsule; Commonly known as: Lyrica; Take 1 capsule (50   mg) by mouth 2 times a day.   promethazine 25 mg tablet; Commonly known as: Phenergan; Take 1 tablet   (25 mg) by mouth every 6 hours if needed for vomiting or nausea.  * This list has 2 medication(s) that are the same as other  medications   prescribed for you. Read the directions carefully, and ask your doctor or   other care provider to review them with you.     ASK your doctor about these medications     cyclobenzaprine 10 mg tablet; Commonly known as: Flexeril   fluticasone 50 mcg/actuation nasal spray; Commonly known as: Flonase       Outpatient Follow-Up  No future appointments.    Amaury Chandler MD

## 2025-02-21 ENCOUNTER — TELEPHONE (OUTPATIENT)
Facility: CLINIC | Age: 45
End: 2025-02-21
Payer: COMMERCIAL

## 2025-02-21 NOTE — TELEPHONE ENCOUNTER
Returned call to Stephon and her  Graham, postoperative course discussed and all questions answered. Pain regimen and stool softener also discussed. POV scheduled for 2/26, encouraged them to reach out with any other questions or concerns.

## 2025-02-26 ENCOUNTER — APPOINTMENT (OUTPATIENT)
Facility: CLINIC | Age: 45
End: 2025-02-26
Payer: COMMERCIAL

## 2025-02-26 VITALS — BODY MASS INDEX: 26.71 KG/M2 | HEIGHT: 66 IN | WEIGHT: 166.2 LBS

## 2025-02-26 DIAGNOSIS — E04.1 THYROID NODULE: Primary | ICD-10-CM

## 2025-02-26 LAB
LABORATORY COMMENT REPORT: NORMAL
PATH REPORT.FINAL DX SPEC: NORMAL
PATH REPORT.GROSS SPEC: NORMAL
PATH REPORT.RELEVANT HX SPEC: NORMAL
PATH REPORT.TOTAL CANCER: NORMAL

## 2025-02-26 PROCEDURE — 1036F TOBACCO NON-USER: CPT | Performed by: OTOLARYNGOLOGY

## 2025-02-26 PROCEDURE — 3008F BODY MASS INDEX DOCD: CPT | Performed by: OTOLARYNGOLOGY

## 2025-02-26 PROCEDURE — 99024 POSTOP FOLLOW-UP VISIT: CPT | Performed by: OTOLARYNGOLOGY

## 2025-02-26 ASSESSMENT — PAIN SCALES - GENERAL: PAINLEVEL_OUTOF10: 5

## 2025-02-26 NOTE — PROGRESS NOTES
ENT Outpatient Consultation    Chief Complaint: enlarged thyroid and thyroid nodules  History Of Present Illness  Stephon Smith is a 44 y.o. female presents for evaluation of her thyroid gland.  Patient has approximately 10-year history of thyroid nodules.  She has not had her thyroid gland evaluated in approximately 5 years.  The right side of her thyroid gland is normal in size and does not have any nodules.  The left side is significantly enlarged and has numerous nodules.  Most of the nodules are TI-RADS 2 and 3 in appearance and fall under the cutoff for biopsy.  She has 1 nodule measuring 1.6 cm that is a TI-RADS 4.  This is the only nodule that currently meets criteria for biopsy    She has had 2 different biopsies previously both of which returned as benign.  I was able to access an old ultrasound from March 2019.  At that time she had 2 nodules that were biopsied one of the measuring over 6 cm in size and having macrocalcifications.  This is one of the nodules that was biopsied.  It is unclear to me how that nodule correlates with all of the separate smaller nodules on the most recent ultrasound palpable    1/30/25: Patient returns for follow-up to discuss upcoming surgery.  She had a CT scan of her chest which was reviewed.  There is no significant substernal extension.  The right side of her thyroid still appears normal.  She is scheduled for hemithyroidectomy in a few weeks    2/26/25: Patient returns for follow-up.  Final pathology returned as benign.  Overall she has been doing okay since surgery.  She has noticed some change in her visual focus.  She is no longer taking pain medication and is only taking Advil and Tylenol     Past Medical History  She has a past medical history of Anxiety, Arthritis, Asthma, Disease of thyroid gland, Dizziness, Fibromyalgia, primary, GERD (gastroesophageal reflux disease), Goiter, Joint pain, Lumbar disc disease, PONV (postoperative nausea and vomiting), Superior  "glenoid labrum lesion of unspecified shoulder, initial encounter, and Unspecified lump in the right breast, unspecified quadrant (12/03/2013).    Surgical History  She has a past surgical history that includes Dilation and curettage of uterus (12/03/2013) and Shoulder surgery.     Social History  She reports that she has never smoked. She has never been exposed to tobacco smoke. She has never used smokeless tobacco. She reports current alcohol use. She reports that she does not use drugs.    Family History  Family History   Problem Relation Name Age of Onset    Breast cancer Mother  63    Breast cancer Maternal Grandmother  42    Breast cancer Maternal Great-Grandmother  74        Allergies  Azithromycin, Bupropion, Cefdinir, Doxycycline, Erythromycin, Latex, Minocycline, Penicillins, and Topiramate     Physical Exam:  Voice strong  Steri-Strips removed  Incision well-approximated  No sign of infection       Last Recorded Vitals  Height 1.676 m (5' 6\"), weight 75.4 kg (166 lb 3.2 oz).    Relevant Results  Reviewed old records    Assessment and Plan  44 y.o. female with enlarged left-sided thyroid gland with multiple nodules and possible compressive symptoms.  She underwent left hemithyroidectomy with benign pathology    -We reviewed incision care  -Order placed for TSH to be obtained in approximately 6 weeks  -We will determine follow-up after labs are obtained    Amaury Chandler MD    "

## 2025-02-28 ENCOUNTER — PATIENT OUTREACH (OUTPATIENT)
Dept: CARE COORDINATION | Facility: CLINIC | Age: 45
End: 2025-02-28
Payer: COMMERCIAL

## 2025-02-28 SDOH — ECONOMIC STABILITY: FOOD INSECURITY
ARE ANY OF YOUR NEEDS URGENT? FOR EXAMPLE, UNCERTAINTY OF WHERE YOU WILL GET YOUR NEXT MEAL OR NOT HAVING THE MEDICATIONS YOU NEED TO TAKE TOMORROW.: NO

## 2025-02-28 SDOH — ECONOMIC STABILITY: GENERAL: WOULD YOU LIKE HELP WITH ANY OF THE FOLLOWING NEEDS?: I DONT NEED HELP WITH ANY OF THESE

## 2025-02-28 NOTE — PROGRESS NOTES
Reviewed chart prior to patient outreach. Outreach call to patient to support a smooth transition of care from recent admission.    Discharge facility: [facility]  Discharge diagnosis: thyroid mass  Admission date:  2/19/25  Discharge date:  2/20/25    PCP Appointment Date: pending scheduling    Spoke with patient, reviewed discharge medications, discharge instructions, assessed social needs, and provided education on importance of follow-up appointment with provider.     See  Hospital Encounter and Summary, and Discharge assessment below for further details. Information obtained from discharge summary from EMR.    Hospital Course   Admitted overnight for observation.  Pain initially uncontrolled however improved with addition of throat lozenges and ibuprofen.  At time of discharge patient was tolerating p.o. intake, pain was controlled, and she was able to ambulate to bathroom.  Drain was removed prior to discharge     CM outreach:  Wrap Up  Wrap Up Additional Comments: Patient reports that she is feeling better than she was initally after discharge from the hospital. Patient's pain is managageable, has not needed to take anything in the last couple of day. Patient is having blurry vision/difficulty focusing, which is new since procedure. She followed up discussed with her surgeon on 2/26/25, not related to procedure, possibly related to anesthesia. Patient to follow up with her PCP if persists. Earliest available appointment that CM was able to see for PCP in April, patient to call PCP office at later time. (2/28/2025 12:22 PM)    Medications  Medications reviewed with patient/caregiver?: Yes (2/28/2025 12:22 PM)  Is the patient having any side effects they believe may be caused by any medication additions or changes?: No (2/28/2025 12:22 PM)  Does the patient have all medications ordered at discharge?: Not applicable (2/28/2025 12:22 PM)  Is the patient taking all medications as directed (includes completed  medication regime)?: Yes (2/28/2025 12:22 PM)    Appointments  Does the patient have a primary care provider?: Yes (2/28/2025 12:22 PM)  Care Management Interventions: Verified appointment date/time/provider (2/28/2025 12:22 PM)  Has the patient kept scheduled appointments due by today?: Yes (2/28/2025 12:22 PM)    Patient Teaching  Does the patient have access to their discharge instructions?: Yes (2/28/2025 12:22 PM)  Care Management Interventions: Reviewed instructions with patient (2/28/2025 12:22 PM)  What is the patient's perception of their health status since discharge?: Improving (2/28/2025 12:22 PM)  Is the patient/caregiver able to teach back the hierarchy of who to call/visit for symptoms/problems? PCP, Specialist, Home Health nurse, Urgent Care, ED, 911: Yes (2/28/2025 12:22 PM)    Will continue to monitor through transition period. Provided patient with my contact information for potential needs.    Ruchi Strong RN BSN  ACO Care Manager  [phone]

## 2025-03-03 ENCOUNTER — APPOINTMENT (OUTPATIENT)
Dept: PRIMARY CARE | Facility: CLINIC | Age: 45
End: 2025-03-03
Payer: COMMERCIAL

## 2025-03-03 VITALS
OXYGEN SATURATION: 98 % | SYSTOLIC BLOOD PRESSURE: 140 MMHG | TEMPERATURE: 97.8 F | DIASTOLIC BLOOD PRESSURE: 80 MMHG | HEART RATE: 76 BPM | WEIGHT: 169 LBS | RESPIRATION RATE: 14 BRPM | HEIGHT: 66 IN | BODY MASS INDEX: 27.16 KG/M2

## 2025-03-03 DIAGNOSIS — E89.0 H/O PARTIAL THYROIDECTOMY: ICD-10-CM

## 2025-03-03 DIAGNOSIS — H53.8 BLURRED VISION: Primary | ICD-10-CM

## 2025-03-03 DIAGNOSIS — E83.51 LOW CALCIUM LEVELS: ICD-10-CM

## 2025-03-03 PROCEDURE — 99214 OFFICE O/P EST MOD 30 MIN: CPT | Performed by: INTERNAL MEDICINE

## 2025-03-03 PROCEDURE — 3008F BODY MASS INDEX DOCD: CPT | Performed by: INTERNAL MEDICINE

## 2025-03-03 PROCEDURE — G8433 SCR FOR DEP NOT CPT DOC RSN: HCPCS | Performed by: INTERNAL MEDICINE

## 2025-03-03 NOTE — PROGRESS NOTES
"Subjective    Stephon Smith is a 44 y.o. female who presents for Blurred Vision.  HPI    Reports constant blurred vision since surgery. Unable to focus. Causing a headache.     Thyroid surgery was on 2/19/2025  It is blurry having hard time focusing.  Headaches    Review of Systems   All other systems reviewed and are negative.        Objective     /80 (BP Location: Left arm, Patient Position: Sitting, BP Cuff Size: Adult)   Pulse 76   Temp 36.6 °C (97.8 °F) (Skin)   Resp 14   Ht 1.676 m (5' 6\")   Wt 76.7 kg (169 lb)   SpO2 98%   BMI 27.28 kg/m²    Physical Exam  Vitals reviewed.   Constitutional:       General: She is not in acute distress.     Appearance: Normal appearance.   Cardiovascular:      Rate and Rhythm: Normal rate and regular rhythm.      Pulses: Normal pulses.      Heart sounds: Normal heart sounds.   Pulmonary:      Effort: Pulmonary effort is normal.      Breath sounds: Normal breath sounds.   Abdominal:      Tenderness: There is no abdominal tenderness.   Musculoskeletal:         General: No swelling.   Skin:     General: Skin is warm and dry.   Neurological:      Mental Status: She is alert.       Health Maintenance Due   Topic Date Due    HIV Screening  Never done    Varicella Vaccines (1 of 2 - 13+ 2-dose series) Never done    Hepatitis C Screening  Never done    Diabetes Screening  Never done    Pneumococcal Vaccine: Pediatrics and At-Risk Adult Patients (1 of 2 - PCV) Never done    DTaP/Tdap/Td Vaccines (3 - Td or Tdap) 01/03/2024    COVID-19 Vaccine (1 - 2024-25 season) Never done          Assessment/Plan   Problem List Items Addressed This Visit    None  Visit Diagnoses       Blurred vision    -  Primary    Relevant Orders    Basic Metabolic Panel (Completed)    Referral to Ophthalmology    H/O partial thyroidectomy        Relevant Orders    Basic Metabolic Panel (Completed)    Low calcium levels        Relevant Orders    Calcium, ionized    PTH, intact (Completed)      "   Check labs, refer to optho.   Call  with any problems or questions.   Follow up depending on results

## 2025-03-04 ENCOUNTER — OFFICE VISIT (OUTPATIENT)
Dept: OPHTHALMOLOGY | Facility: CLINIC | Age: 45
End: 2025-03-04
Payer: COMMERCIAL

## 2025-03-04 ENCOUNTER — APPOINTMENT (OUTPATIENT)
Dept: OPHTHALMOLOGY | Facility: CLINIC | Age: 45
End: 2025-03-04
Payer: COMMERCIAL

## 2025-03-04 DIAGNOSIS — H52.203 HYPEROPIA OF BOTH EYES WITH ASTIGMATISM: Primary | ICD-10-CM

## 2025-03-04 DIAGNOSIS — H53.8 BLURRED VISION: ICD-10-CM

## 2025-03-04 DIAGNOSIS — H52.03 HYPEROPIA OF BOTH EYES WITH ASTIGMATISM: Primary | ICD-10-CM

## 2025-03-04 LAB
ANION GAP SERPL CALCULATED.4IONS-SCNC: 14 MMOL/L (CALC) (ref 7–17)
BUN SERPL-MCNC: 9 MG/DL (ref 7–25)
BUN/CREAT SERPL: NORMAL (CALC) (ref 6–22)
CA-I SERPL-MCNC: 4.9 MG/DL (ref 4.7–5.5)
CALCIUM SERPL-MCNC: 9.1 MG/DL (ref 8.6–10.2)
CHLORIDE SERPL-SCNC: 103 MMOL/L (ref 98–110)
CO2 SERPL-SCNC: 24 MMOL/L (ref 20–32)
CREAT SERPL-MCNC: 0.82 MG/DL (ref 0.5–0.99)
EGFRCR SERPLBLD CKD-EPI 2021: 92 ML/MIN/1.73M2
GLUCOSE SERPL-MCNC: 86 MG/DL (ref 65–139)
POTASSIUM SERPL-SCNC: 4 MMOL/L (ref 3.5–5.3)
PTH-INTACT SERPL-MCNC: 30 PG/ML (ref 16–77)
SODIUM SERPL-SCNC: 141 MMOL/L (ref 135–146)

## 2025-03-04 PROCEDURE — 92015 DETERMINE REFRACTIVE STATE: CPT | Performed by: OPTOMETRIST

## 2025-03-04 PROCEDURE — 92004 COMPRE OPH EXAM NEW PT 1/>: CPT | Performed by: OPTOMETRIST

## 2025-03-04 ASSESSMENT — CONF VISUAL FIELD
OS_NORMAL: 1
OS_INFERIOR_NASAL_RESTRICTION: 0
OD_SUPERIOR_TEMPORAL_RESTRICTION: 0
OD_SUPERIOR_NASAL_RESTRICTION: 0
OD_INFERIOR_NASAL_RESTRICTION: 0
OS_INFERIOR_TEMPORAL_RESTRICTION: 0
OS_SUPERIOR_TEMPORAL_RESTRICTION: 0
OD_INFERIOR_TEMPORAL_RESTRICTION: 0
METHOD: COUNTING FINGERS
OD_NORMAL: 1
OS_SUPERIOR_NASAL_RESTRICTION: 0

## 2025-03-04 ASSESSMENT — TONOMETRY
IOP_METHOD: GOLDMANN APPLANATION
OS_IOP_MMHG: 16
OD_IOP_MMHG: 16

## 2025-03-04 ASSESSMENT — SLIT LAMP EXAM - LIDS
COMMENTS: NORMAL
COMMENTS: NORMAL

## 2025-03-04 ASSESSMENT — REFRACTION_MANIFEST
OS_AXIS: 095
OS_CYLINDER: -0.75
METHOD_AUTOREFRACTION: 1
OS_CYLINDER: -0.75
OS_CYLINDER: -0.75
OD_SPHERE: +1.25
OS_AXIS: 106
OS_SPHERE: +1.00
OD_AXIS: 095
OD_CYLINDER: -1.00
OS_SPHERE: +0.50
OS_AXIS: 100
OS_SPHERE: +0.75
OD_SPHERE: +1.00
OD_SPHERE: +0.25
OD_CYLINDER: -1.00
OD_AXIS: 099
OD_CYLINDER: -0.50
OD_AXIS: 085

## 2025-03-04 ASSESSMENT — ENCOUNTER SYMPTOMS
RESPIRATORY NEGATIVE: 0
NEUROLOGICAL NEGATIVE: 0
ENDOCRINE NEGATIVE: 1
HEMATOLOGIC/LYMPHATIC NEGATIVE: 0
GASTROINTESTINAL NEGATIVE: 0
CARDIOVASCULAR NEGATIVE: 0
PSYCHIATRIC NEGATIVE: 1
MUSCULOSKELETAL NEGATIVE: 0
CONSTITUTIONAL NEGATIVE: 0
EYES NEGATIVE: 0
ALLERGIC/IMMUNOLOGIC NEGATIVE: 0

## 2025-03-04 ASSESSMENT — REFRACTION
OD_CYLINDER: -0.75
OD_AXIS: 090
OS_CYLINDER: -0.75
OS_AXIS: 103
OS_SPHERE: +0.75
OD_SPHERE: +0.75

## 2025-03-04 ASSESSMENT — VISUAL ACUITY
OD_SC+: -2
OS_SC: 20/20
OD_SC: 20/20
METHOD: SNELLEN - LINEAR

## 2025-03-04 ASSESSMENT — CUP TO DISC RATIO
OS_RATIO: 0.2
OD_RATIO: 0.2

## 2025-03-04 ASSESSMENT — EXTERNAL EXAM - LEFT EYE: OS_EXAM: NORMAL

## 2025-03-04 ASSESSMENT — EXTERNAL EXAM - RIGHT EYE: OD_EXAM: NORMAL

## 2025-03-04 NOTE — PROGRESS NOTES
Assessment/Plan   Problem List Items Addressed This Visit       Hyperopia of both eyes with astigmatism - Primary     Moderate Rx. Anterior and posterior ocular health WNL OU.  Pt educated on findings and hyperopic Rx. Release Rx for FTW. Discussed adaptation. Monitor annually. RTC if reporting issues with near/intermediate vision for possibly adding bifocal component to Rx. Pt voiced understanding.             Other Visit Diagnoses       Blurred vision

## 2025-03-04 NOTE — ASSESSMENT & PLAN NOTE
Moderate Rx. Anterior and posterior ocular health WNL OU.  Pt educated on findings and hyperopic Rx. Release Rx for FTW. Discussed adaptation. Monitor annually. RTC if reporting issues with near/intermediate vision for possibly adding bifocal component to Rx. Pt voiced understanding.

## 2025-03-14 ENCOUNTER — PATIENT OUTREACH (OUTPATIENT)
Dept: CARE COORDINATION | Facility: CLINIC | Age: 45
End: 2025-03-14
Payer: COMMERCIAL

## 2025-03-14 NOTE — PROGRESS NOTES
Outreach call to patient to support a smooth transition of care from recent admission.  Left voicemail message for patient with my contact information.    Ruchi Strong RN BSN  ACO Care Manager  [phone]

## 2025-04-02 ENCOUNTER — PATIENT OUTREACH (OUTPATIENT)
Dept: CARE COORDINATION | Facility: CLINIC | Age: 45
End: 2025-04-02
Payer: COMMERCIAL

## 2025-04-02 NOTE — PROGRESS NOTES
Reviewed chart prior to patient outreach. Outreach call to patient to check in 30 days after hospital discharge to support smooth transition of care. Patient has followed up with her PCP and ophthalmology. Her blurry vision has improved, not back to baseline. She has intermittent headaches, patient attributing to changes in weather. Patient with no additional needs noted. No additional outreach needed at this time.    Ruchi Strong RN BSN  O Care Manager  [phone]

## 2025-04-26 LAB — TSH SERPL-ACNC: 1.04 MIU/L

## 2025-05-22 DIAGNOSIS — M79.7 FIBROMYALGIA: ICD-10-CM

## 2025-05-22 RX ORDER — PREGABALIN 75 MG/1
75 CAPSULE ORAL 2 TIMES DAILY
Qty: 180 CAPSULE | Refills: 3 | Status: SHIPPED | OUTPATIENT
Start: 2025-05-22 | End: 2026-05-22

## 2025-06-05 ENCOUNTER — HOSPITAL ENCOUNTER (OUTPATIENT)
Dept: RADIOLOGY | Facility: CLINIC | Age: 45
Discharge: HOME | End: 2025-06-05
Payer: COMMERCIAL

## 2025-06-05 ENCOUNTER — OFFICE VISIT (OUTPATIENT)
Dept: ORTHOPEDIC SURGERY | Facility: CLINIC | Age: 45
End: 2025-06-05
Payer: COMMERCIAL

## 2025-06-05 DIAGNOSIS — M25.511 RIGHT SHOULDER PAIN, UNSPECIFIED CHRONICITY: ICD-10-CM

## 2025-06-05 DIAGNOSIS — M54.2 NECK PAIN: ICD-10-CM

## 2025-06-05 DIAGNOSIS — M50.20 HERNIATED DISC, CERVICAL: ICD-10-CM

## 2025-06-05 DIAGNOSIS — M54.12 CERVICAL RADICULOPATHY: ICD-10-CM

## 2025-06-05 PROCEDURE — 73030 X-RAY EXAM OF SHOULDER: CPT | Mod: RT

## 2025-06-05 PROCEDURE — 72040 X-RAY EXAM NECK SPINE 2-3 VW: CPT

## 2025-06-05 PROCEDURE — 72040 X-RAY EXAM NECK SPINE 2-3 VW: CPT | Performed by: FAMILY MEDICINE

## 2025-06-05 PROCEDURE — 99212 OFFICE O/P EST SF 10 MIN: CPT | Performed by: FAMILY MEDICINE

## 2025-06-05 PROCEDURE — 73030 X-RAY EXAM OF SHOULDER: CPT | Mod: RIGHT SIDE | Performed by: FAMILY MEDICINE

## 2025-06-05 PROCEDURE — 99214 OFFICE O/P EST MOD 30 MIN: CPT | Performed by: FAMILY MEDICINE

## 2025-06-05 PROCEDURE — 1036F TOBACCO NON-USER: CPT | Performed by: FAMILY MEDICINE

## 2025-06-05 RX ORDER — CELECOXIB 200 MG/1
200 CAPSULE ORAL DAILY
Qty: 30 CAPSULE | Refills: 0 | Status: SHIPPED | OUTPATIENT
Start: 2025-06-05 | End: 2025-07-05

## 2025-06-05 RX ORDER — PREDNISONE 10 MG/1
TABLET ORAL
Qty: 30 TABLET | Refills: 0 | Status: SHIPPED | OUTPATIENT
Start: 2025-06-05

## 2025-06-05 RX ORDER — DIAZEPAM 5 MG/1
5 TABLET ORAL NIGHTLY PRN
Qty: 10 TABLET | Refills: 0 | Status: SHIPPED | OUTPATIENT
Start: 2025-06-05

## 2025-06-05 NOTE — PROGRESS NOTES
Acute Injury New Patient Visit  Assessment & Plan  Cervical radiculopathy  Cervical radiculopathy with persistent pain, burning, tingling, and numbness in the right arm and hand. Differential includes nerve impingement or herniated disc, likely due to overuse and muscle spasms. MRI planned to confirm diagnosis.  - Order MRI of the cervical spine.  - Refer to physical therapy at [facility] for traction and other modalities.  - Prescribe a 50 mg steroid taper (50, 50, 40, 40, 30, 30, 20, 20, 10, 10).  - Prescribe Valium 5 mg, 10 tablets, to be taken at nighttime for muscle spasms.  She may then resume resume her Flexeril  - Prescribe Celebrex 200 mg once daily for one month.  -Will hold off on gabapentin for now as she is currently on Lyrica.  - Instructed to call or return sooner with any severe worsening pains or debilitating issues.  - Patient to follow-up with our spine team after the MRI for further evaluation and ongoing management going forward.    Herniated cervical disc  Suspected herniated cervical disc contributing to cervical radiculopathy symptoms. MRI planned to confirm diagnosis and assess herniation extent.  - Order MRI of the cervical spine.  - Refer to physical therapy at [facility] for traction and other modalities.  - Prescribe a 50 mg steroid taper (50, 50, 40, 40, 30, 30, 20, 20, 10, 10).  - Prescribe Valium 5 mg, 10 tablets, to be taken at nighttime for muscle spasms.  - Prescribe Celebrex 200 mg once daily for one month.    Fibromyalgia  Fibromyalgia with recent symptom exacerbation. Current medications include Lyrica and Savella. Recent Lyrica increase ineffective.  - Continue current fibromyalgia medications (Lyrica and Savella).  - Consider adjusting fibromyalgia management if symptoms persist.    Orders Placed This Encounter    XR shoulder right 2+ views    XR cervical spine 2-3 views    MR cervical spine wo IV contrast    Referral to Physical Therapy    predniSONE (Deltasone) 10 mg tablet     diazePAM (Valium) 5 mg tablet    celecoxib (CeleBREX) 200 mg capsule     Procedures   At the conclusion of the visit there were no further questions by the patient/family regarding their plan of care.  Patient was instructed to call or return with any issues, questions, or concerns regarding their injury and/or treatment plan described above.    PHYSICAL EXAM:  General:  Patient is awake, alert, and oriented to person place and time.  Patient appears well nourished and well kept.  Affect Calm, Not Acutely Distressed.  Heent:  Normocephalic, Atraumatic, EOMI  Cardiovascular:  Hemodynamically stable.  Respiratory:  Normal respirations with unlabored breathing.  Neuro: Gross sensation intact to the upper extremities bilaterally.  Extremity: Neck exam demonstrates tenderness down the midline and right worse than left paraspinal spasms she is significant discomfort with neck extension flexion sidebending and rotation all with minimal movement.  Positive Spurling's on the right.  Significant soft tissue tenderness over the trapezius and rhomboid.  Soft tissue tenderness globally about the shoulder.  Patient is able to gently move her arm forward with full flexion and lateral abduction she has 4+ out of 5 strength with resisted abduction and supraspinatus testing.   strength slightly decreased on the right when compared to the left 4+ out of 5.  Forearm compartments are soft and compressible 4 out of 5 strength with wrist flexion wrist extension when compared to the contralateral limb 4-5 strength with right distal biceps and tricep strength testing on the right when compared to the left.  Negative Neer's Maya and Jay's test.  Negative speeds and Yergason's Test.  Physical Exam  MUSCULOSKELETAL: Shoulder irritation from overuse. Neck stiff and sore.  NEUROLOGICAL:  strength and resistance normal.    IMAGING:   Results      XR shoulder right 2+ views  Narrative: Interpreted By:  Budinsky, Cole,   STUDY:  XR  SHOULDER RIGHT 2+ VIEWS; ;  6/5/2025 3:36 pm      INDICATION:  Signs/Symptoms:pain.      ACCESSION NUMBER(S):  [id number]      ORDERING CLINICIAN:  COLE BUDINSKY      Impression: Four views right shoulder demonstrate no presence for acute fracture  or dislocation. No presence for any degenerative changes. Status post  biceps tenodesis. Overall impression normal appearing unremarkable  right shoulder.          Signed by: Cole Budinsky 6/5/2025 5:07 PM  Dictation workstation:   [id number]  XR cervical spine 2-3 views  Narrative: Interpreted By:  Budinsky, Cole,   STUDY:  XR CERVICAL SPINE 2-3 VIEWS; ;  6/5/2025 3:36 pm      INDICATION:  Signs/Symptoms:pain.      ACCESSION NUMBER(S):  [id number]      ORDERING CLINICIAN:  COLE BUDINSKY      Impression: Cervical spine films demonstrate no presence for acute fracture or  listhesis. Loss of the cervical lordotic curve noted with cervical  spine straightening present. No significant degenerative changes  noted. Postoperative surgical clips seen throughout the anterior neck  soft tissues.          Signed by: Cole Budinsky 6/5/2025 5:06 PM  Dictation workstation:   [id number]      Patient ID: Stephon Smith is a 44 y.o. female who presents for Pain of the Right Shoulder (Xrays today ).  History of Present Illness  Stephon Smith is a 44 year old female with fibromyalgia who presents with severe arm and neck pain.    She has been experiencing severe pain and stiffness in her arm and neck for the past two to three weeks. Initially, she attempted self-care with massage and Biofreeze, but her symptoms worsened. The pain is described as severe, with associated burning, tingling, and numbness in the hands and fingers. She also notes discoloration in her hands and difficulty using her arm.    She has a history of fibromyalgia and initially thought the symptoms were a flare-up. Her Lyrica dosage was increased, but this did not alleviate her symptoms. She then tried a  steroid pack a week ago, which reduced her pain from a ten to a five, but the pain has since returned to previous levels.    She has a history of a SLAP tear and repair with biceps tendon reattachment. 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    Current medications include Lyrica and Savella for fibromyalgia, and she has been on muscle relaxers, which have not been effective. She is not currently taking gabapentin. She is concerned about the interaction of Lyrica with other medications.    The pain is affecting her mental health due to its severity. She describes the pain as both a stretch and nerve pain, particularly when moving her neck. She experiences weakness, dropping objects when she feels a 'pulse' of pain, and notes that her neck feels 'full' and inflamed.        This medical note was created with the assistance of artificial intelligence (AI) for documentation purposes. The content has been reviewed and confirmed by the healthcare provider for accuracy and completeness. Patient consented to the use of audio recording and use of AI during their visit.   06/05/25 at 6:08 PM - Cole C Budinsky, MD  Office:  [phone]

## 2025-06-10 ASSESSMENT — ENCOUNTER SYMPTOMS
NAUSEA: 0
DYSURIA: 0
COLOR CHANGE: 0
DIARRHEA: 0
AGITATION: 0
DIZZINESS: 1
SHORTNESS OF BREATH: 0
VOMITING: 0
DIFFICULTY URINATING: 0
FEVER: 0

## 2025-06-10 NOTE — PROGRESS NOTES
Assessment/Plan   Neck pain is most consistent with lower cervical radiculopathy. Possible disc displacement -- advised scheduling CS MRI. LBP and BL L>R gluteal pain potentially linked to GTPS vs hip impingement. Neck pain appears myofascial. Likely complicated by FMS. Ordering hip radiographs BL.  Treatment will involve soft tissue and spinal manipulation, we will avoid dry needling due to patient preference to initially avoid, but to consider at a later date if needed.    CS XR 2025 - IMPRESSION:  Cervical spine films demonstrate no presence for acute fracture or  listhesis. Loss of the cervical lordotic curve noted with cervical  spine straightening present. No significant degenerative changes  noted. Postoperative surgical clips seen throughout the anterior neck  soft tissues.    LS XR 2024 - IMPRESSION:  Normal radiograph of the lumbar spine    Visits this year: 2    Subjective      Patient reports recent flare up of neck pain     HPI - Neck pain 6/11/25 - Patient reports recent flare up of neck pain radiating down the right arm to the hand. Describes it as painful and numb. Pain has been severe over the past month with significant effect on mental health. Unclear onset of pain but may have been worsened by fall on stone steps. She has tried multiple palliative options for pain over the past month - deep tissue massage, biofeeze, heat/ice, pain medication - most only mildly helpful. She was recently prescribed a steroid taper which has been palliative. The pain is bearable today rated 5/10. She is going for her initial PT evaluation today.  -Initially presented 1/24/25 with chronic neck pain --- recall -- patient reports chronic neck pain and tightness at least as early as 2020 but seems to have been worse since fall 2024 without specific trauma or injury.  Symptoms are localized without radiation numbness or tingling or headache.  She does have a history of right shoulder labrum tear and repair which was  successful however over the past few months the right shoulder has begun bothering her again with limited range of motion.  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  She also endorses occasional radiating or aching dull diffuse pain or numbness and tingling in the right lower extremity and lateral foot.  Previously she had seen a chiropractor with some relief of symptoms although symptoms appear worse now than when she visited the chiropractor years ago.  She works as an ultrasonographer and notes that this may aggravate symptoms that she is sitting in awkward positions but tries to keep things ergonomic.  Pain is present despite position whether she is sitting standing or lying down    Review of Systems   Constitutional:  Negative for fever.   Eyes:  Negative for visual disturbance.   Respiratory:  Negative for shortness of breath.    Cardiovascular:  Negative for chest pain.   Gastrointestinal:  Negative for diarrhea, nausea and vomiting.   Genitourinary:  Negative for difficulty urinating and dysuria.   Skin:  Negative for color change.   Neurological:  Positive for dizziness (Occasional episodes. Previous Dx of POTS).   Psychiatric/Behavioral:  Negative for agitation.    All other systems reviewed and are negative.    Objective   Examination findings (e.g., palpation & ROM):   Decreased C/S and L/S ROM with pain, hypertonic and tender cervical and lumbar erectors, BL upper trapezius & g med  SLR BL 50 with tightness  Pain locally w/ FAIR and ELIAS BL    Segmental joint dysfunction was identified in the following areas using motion palpation and/or pain provocation assessment:  Cervical:   Thoracic: 5-8  Lumbopelvic: 1, BL SIJ      Physical  Exam  Neurological:      General: No focal deficit present.      Mental Status: She is alert.      Sensory: Sensation is intact.      Motor: Weakness present.      Coordination: Coordination is intact.      Gait: Gait is intact.      Deep Tendon Reflexes:      Reflex Scores:       Tricep reflexes are 1+ on the right side and 2+ on the left side.       Bicep reflexes are 2+ on the right side and 2+ on the left side.       Brachioradialis reflexes are 2+ on the right side and 2+ on the left side.       Patellar reflexes are 2+ on the right side and 2+ on the left side.       Achilles reflexes are 2+ on the right side and 2+ on the left side.     Comments: 6/11/25  -belkis: neg   -R triceps 3/5      Plan   Today's treatment:  SMT to regions of segmental dysfunction identified on exam, using age-appropriate force, and manual diversified technique.   STM to patient tolerance to hypertonic paraspinal muscles  Dry needling (5 in, 5 out) to region of the chief complaint / hypertonic muscles identified upon palpation in CS erectors, R upper trapezius  Manual dynamic stretching of the upper trapezius, R periscapular mm  10:02 to 10:18 AM  Patient noted improved mobility and reduced pain post-treatment    Treatment Plan:   The patient and I discussed the risks and benefits of chiropractic care. Based on the patient's subjective complaints along with the examination findings, it is advised that a course of chiropractic treatment be initiated. The patient provided consent for care. The patient tolerated today's treatment with little or no additional discomfort and was instructed to contact the office for questions or concerns. Will see patient once per week then every 2 weeks when symptoms become mild/manageable, further spaced apart contingent upon improvement.     This chart note was generated using dictation software, and as such, there may be typographical errors present. Abbreviations: Cervical spine (CS),  cervical-thoracic (CT), Dry needling (DN), Flexion adduction internal rotation (FAIR), high velocity, low amplitude (HVLA), Lumbar spine (LS), Soft tissue manipulation (STM), spinal manipulative therapy (SMT), Straight leg raise (SLR), Thoracic spine (TS).

## 2025-06-11 ENCOUNTER — APPOINTMENT (OUTPATIENT)
Dept: INTEGRATIVE MEDICINE | Facility: CLINIC | Age: 45
End: 2025-06-11
Payer: COMMERCIAL

## 2025-06-11 DIAGNOSIS — M99.02 SOMATIC DYSFUNCTION OF THORACIC REGION: ICD-10-CM

## 2025-06-11 DIAGNOSIS — M54.12 CERVICAL RADICULOPATHY: ICD-10-CM

## 2025-06-11 DIAGNOSIS — M99.01 CERVICAL SEGMENT DYSFUNCTION: ICD-10-CM

## 2025-06-11 DIAGNOSIS — M54.2 NECK PAIN: ICD-10-CM

## 2025-06-11 DIAGNOSIS — M99.03 SOMATIC DYSFUNCTION OF LUMBAR REGION: Primary | ICD-10-CM

## 2025-06-11 DIAGNOSIS — M99.05 SOMATIC DYSFUNCTION OF PELVIS REGION: ICD-10-CM

## 2025-06-11 PROCEDURE — 97112 NEUROMUSCULAR REEDUCATION: CPT | Performed by: CHIROPRACTOR

## 2025-06-11 PROCEDURE — 98941 CHIROPRACT MANJ 3-4 REGIONS: CPT | Performed by: CHIROPRACTOR

## 2025-06-14 ENCOUNTER — HOSPITAL ENCOUNTER (OUTPATIENT)
Dept: RADIOLOGY | Facility: HOSPITAL | Age: 45
Discharge: HOME | End: 2025-06-14
Payer: COMMERCIAL

## 2025-06-14 DIAGNOSIS — M50.20 HERNIATED DISC, CERVICAL: ICD-10-CM

## 2025-06-14 DIAGNOSIS — M54.12 CERVICAL RADICULOPATHY: ICD-10-CM

## 2025-06-14 PROCEDURE — 72141 MRI NECK SPINE W/O DYE: CPT | Performed by: RADIOLOGY

## 2025-06-14 PROCEDURE — 72141 MRI NECK SPINE W/O DYE: CPT

## 2025-06-27 ENCOUNTER — APPOINTMENT (OUTPATIENT)
Dept: RADIOLOGY | Facility: HOSPITAL | Age: 45
End: 2025-06-27
Payer: COMMERCIAL

## 2025-07-07 ENCOUNTER — OFFICE VISIT (OUTPATIENT)
Dept: ORTHOPEDIC SURGERY | Facility: CLINIC | Age: 45
End: 2025-07-07
Payer: COMMERCIAL

## 2025-07-07 DIAGNOSIS — M54.12 CERVICAL RADICULOPATHY: Primary | ICD-10-CM

## 2025-07-07 PROCEDURE — 99212 OFFICE O/P EST SF 10 MIN: CPT | Performed by: PHYSICIAN ASSISTANT

## 2025-07-07 PROCEDURE — 99214 OFFICE O/P EST MOD 30 MIN: CPT | Performed by: PHYSICIAN ASSISTANT

## 2025-07-07 NOTE — PROGRESS NOTES
Stephon Smith is a 45 y.o. female who presents for New Patient Visit of the Neck (Referred by Dr. Budinsky/Xr 6/5/25  MRI 6/14/25).    HPI:  45-year-old female here for new patient evaluation of neck pain.  Sent by Dr. Budinsky has x-rays and MRI.  She denies any fever chills nausea vomiting ices.  She has no bowel or bladder complaints.    Physical exam:  Well-nourished, well kept.  No lymphangitis or lymphadenopathy in the examined extremities. Affect normal.  Alert and oriented X 3.  Coordination normal.  Patient can rise from a seated position, can sit from a standing position. Can stand on heels and toes.  Patient is tender in the paraspinal musculature of the cervical spine. range of motion is mildly decreased secondary to some pain and stiffness no weakness no instability to muscle strength. examination of the upper extremities reveals no point tenderness, swelling, or deformity.  Range of motion of the shoulders, elbows, wrists, and fingers are full without crepitance, instability, or exacerbation of pain. Strength is 5/5 throughout except I get a little bit of wrist weakness on the right. no redness, abrasions, or lesions on the upper extremities bilaterally.  Gross sensation intact to the extremities.  Deep tendon reflexes 2+ and symmetric bilaterally.  Tabares negative.     Imaging studies:  I reviewed an MRI of the cervical spine from June 14, 2025.  I reviewed cervical spine x-rays from June 5, 2025.  I reviewed her x-ray of her right shoulder from June 5, 2025.    Assessment:  45-year-old female here for new patient evaluation of posterior cervical pain, parascapular pain and right arm radicular symptoms into the right hand.  She saw Dr. Budinsky on June 5 for this.  This has been going on a month or so.  It is starting to affect her bodily function.  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  She  does have a little bit of weakness with her right wrist.  She has tried a full course of physical therapy that did not make her any better.  She does not think she can live like this, but she is not sure that she wants surgery.  We did discuss potential pain management but I am not sure that injections would give her the relief that she is looking for.  I think if we were to do an operation on her it would be a C5-6 ACDF.    Plan:  We had a long discussion today about her options, she has stenosis and a disc bulge on the right at C5-6.  There may be very minimal involvement at C4-5.  She has wrist weakness on the right, and we did discuss and consider surgery today which would be a C5-6 ACDF.  I do not think injections are going to help her.  She did a full course of physical therapy and it did not help.  She does not take blood thinners, she is not diabetic, she does not smoke.  Her BMI is 27.  She does have some activity related asthma but otherwise she is healthy.  I am going to get her into meet with Dr. Stacy for surgical consultation, while she is waiting on that appointment she is going to go back into another round of physical therapy.    I have reviewed test today x 3 x-ray x 2, MRI.  I reviewed the note from Dr. Budinsky from June 5, 2025.  This is an undiagnosed new problem with uncertain prognosis.  We did consider and discussed surgery today.    Amaury Tavarez PA-C

## 2025-07-17 ASSESSMENT — ENCOUNTER SYMPTOMS
VOMITING: 0
DIZZINESS: 1
DIFFICULTY URINATING: 0
AGITATION: 0
DYSURIA: 0
DIARRHEA: 0
COLOR CHANGE: 0
SHORTNESS OF BREATH: 0
NAUSEA: 0
FEVER: 0

## 2025-07-17 NOTE — TELEPHONE ENCOUNTER
Subjective     Coco Mahoney is a 42 y.o. female who presents today for follow up    Chief Complaint: anxiety       History of Present Illness:    History of Present Illness  Coco is a 41-year-old female who presents today for a follow-up visit with me.  She states that she has been doing well.  She started working at [employer] for the summer.  States that she has noticed a slight increase in anxiety in the afternoon. She is sleeping well at night but does have some hot flashes/other Perimenopause symptoms.  Dates that she has started taking a supplement, Estroven.  appetite is good.  Denies any side effects of the medications.  No issues with depression at this time.  No SI/HI/AVH.       The following portions of the patient's history were reviewed and updated as appropriate: allergies, current medications, past family history, past medical history, past social history, past surgical history and problem list.    Past Psychiatric History:  Began Treatment:Early 20's   Diagnoses:Anxiety and panic disorder  Psychiatrist:PARAMJIT Samuel  Therapist:as a child  Admission History:Denies  Medication Trials:Lexapro, Celexa, propranolol, Wellbutrin (more depression), Ativan, Xanax, hydroxyzine, buspar, paxil, Elavil, klonopin,   Self Harm: Denies  Suicide Attempts:Denies   Psychosis, Anxiety, Depression: Denies    Past Medical History:  Past Medical History:   Diagnosis Date    Anxiety     Migraine headache     Nongonococcal urethritis due to chlamydia trachomatis     Panic disorder     Syncope        Substance Abuse History:   Prescribed benzo's.    Social History:  Social History     Socioeconomic History    Marital status:    Tobacco Use    Smoking status: Former    Smokeless tobacco: Never    Tobacco comments:     Half a pack a day for 5 years.   Vaping Use    Vaping status: Never Used   Substance and Sexual Activity    Alcohol use: Yes     Comment: Social use    Drug use:  Scheduled Mamm and US   No    Sexual activity: Yes     Partners: Male       Family History:  Family History   Problem Relation Age of Onset    Migraines Mother     Atrial fibrillation Father     Diabetes Father     Heart disease Father     Hypertension Brother     Breast cancer Paternal Grandmother        Past Surgical History:  Past Surgical History:   Procedure Laterality Date     SECTION      DILATATION AND CURETTAGE      HERNIA REPAIR      INGUINAL HERNIA REPAIR         Problem List:  Patient Active Problem List   Diagnosis    Didelphic uterus    Generalized anxiety disorder    Panic disorder    Migraine with aura and without status migrainosus, not intractable       Allergy:   No Known Allergies     Current Medications:   Current Outpatient Medications   Medication Sig Dispense Refill    escitalopram (LEXAPRO) 20 MG tablet Take 1 tablet by mouth Daily. 30 tablet 2    LORazepam (ATIVAN) 1 MG tablet TAKE ONE TABLET BY MOUTH TWO TIMES A DAY AS NEEDED 60 tablet 2    multivitamin with minerals tablet tablet Take 1 tablet by mouth Daily.       No current facility-administered medications for this visit.       Review of Systems:    Review of Systems   Constitutional:  Positive for fatigue.   Psychiatric/Behavioral:  Negative for decreased concentration, sleep disturbance and depressed mood. The patient is nervous/anxious.          Physical Exam:   Physical Exam  Vitals reviewed.   Constitutional:       Appearance: Normal appearance.   Musculoskeletal:      Cervical back: Normal range of motion.   Neurological:      Mental Status: She is alert and oriented to person, place, and time. Mental status is at baseline.   Psychiatric:         Attention and Perception: Attention and perception normal.         Mood and Affect: Mood and affect normal. Mood is not anxious.         Speech: Speech normal.         Behavior: Behavior normal. Behavior is cooperative.         Thought Content: Thought content normal.         Cognition and Memory:  "Cognition and memory normal.         Judgment: Judgment normal.         Vitals:  Blood pressure 128/88, pulse 78, height 175.3 cm (69.02\"), weight 76.2 kg (168 lb), SpO2 99%.   Body mass index is 24.79 kg/m².    Last 3 Blood Pressure Readings:  BP Readings from Last 3 Encounters:   07/17/25 128/88   04/15/25 126/80   02/18/25 140/84     Mental Status Exam:   Hygiene:   good  Cooperation:  Cooperative  Eye Contact:  Good  Psychomotor Behavior:  Appropriate  Affect:  Full range  Mood: normal and anxious  Hopelessness: Denies  Speech:  Normal  Thought Process:  Linear  Thought Content:  Normal  Suicidal:  None  Homicidal:  None  Hallucinations:  None  Delusion:  None  Memory:  Intact  Orientation:  Person, Place, Time, and Situation  Reliability:  good  Insight:  Fair  Judgement:  Fair  Impulse Control:  Fair  Physical/Medical Issues:  Yes see pmh     Lab Results:   No visits with results within 3 Month(s) from this visit.   Latest known visit with results is:   Office Visit on 01/21/2025   Component Date Value Ref Range Status    Amphetamine Screen, Urine 01/21/2025 Negative  Negative Final    Referto confirmation by [facility] in scanned documents    Barbiturates Screen, Urine 01/21/2025 Negative  Negative Final    Buprenorphine, Screen, Urine 01/21/2025 Negative  Negative Final    Benzodiazepine Screen, Urine 01/21/2025 Negative  Negative Final    Cocaine Screen, Urine 01/21/2025 Negative  Negative Final    MDMA (ECSTASY) 01/21/2025 Negative  Negative Final    Methamphetamine, Ur 01/21/2025 Negative  Negative Final    Methadone Screen, Urine 01/21/2025 Negative  Negative Final    Morphine/Opiates Screen, Urine 01/21/2025 Negative  Negative Final    Oxycodone Screen, Urine 01/21/2025 Negative  Negative Final    Phencyclidine (PCP), Urine 01/21/2025 Negative  Negative Final    Propoxyphene Scree, Urine 01/21/2025 Negative  Negative Final    Tricyclic Antidepressants Screen 01/21/2025 Negative  Negative Final    THC, " Screen, Urine 01/21/2025 Negative  Negative Final         Assessment & Plan   Diagnoses and all orders for this visit:    1. Panic disorder (Primary)    2. Generalized anxiety disorder    Other orders  -     escitalopram (LEXAPRO) 20 MG tablet; Take 1 tablet by mouth Daily.  Dispense: 30 tablet; Refill: 2              Visit Diagnoses:    ICD-10-CM ICD-9-CM   1. Panic disorder  F41.0 300.01   2. Generalized anxiety disorder  F41.1 300.02             GOALS:  Short Term Goals: Patient will be compliant with medication, and patient will have no significant medication related side effects.  Patient will be engaged in psychotherapy as indicated.  Patient will report subjective improvement of symptoms.  Long term goals: To stabilize mood and treat/improve subjective symptoms, the patient will stay out of the hospital, the patient will be at an optimal level of functioning, and the patient will take all medications as prescribed.  The patient/guardian verbalized understanding and agreement with goals that were mutually set.      TREATMENT PLAN: Continue supportive psychotherapy efforts and medications as indicated.  Pharmacological and Non-Pharmacological treatment options discussed during today's visit. Patient/Guardian acknowledged and verbally consented with current treatment plan and was educated on the importance of compliance with treatment and follow-up appointments.      MEDICATION ISSUES:  Discussed medication options and treatment plan of prescribed medication as well as the risks, benefits, any black box warnings, and side effects including potential falls, possible impaired driving, and metabolic adversities among others. Patient is agreeable to call the office with any worsening of symptoms or onset of side effects, or if any concerns or questions arise.  The contact information for the office is made available to the patient. Patient is agreeable to call 911 or go to the nearest ER should they begin having any  SI/HI, or if any urgent concerns arise. No medication side effects or related complaints today.     MEDS ORDERED DURING VISIT:  New Medications Ordered This Visit   Medications    escitalopram (LEXAPRO) 20 MG tablet     Sig: Take 1 tablet by mouth Daily.     Dispense:  30 tablet     Refill:  2     Plan:  - Continue Lexapro 20 mg by mouth daily for anxiety.  - Continue Ativan 1 mg by mouth up to 2 times daily.  Had a lengthy discussion with patient in regards to this medication.  Patient is aware that this medication is potentially addictive and can lead to tolerance, dependence, and she may exhibit withdrawal symptoms upon cessation.  Patient verbalizes understanding that this medication is meant for short-term use while finding another medication that may work to prevent panic attacks.  -Christiano reviewed and is appropriate at this time.  - Urine drug screen testing negative for all substances tested at this time.  -Patient verbalizes understanding to go to nearest ED if SI/HI develop.  Follow Up Appointment:   Return in about 3 months (around 10/17/2025) for Recheck.             This document has been electronically signed by PARAMJIT Tripp  July 17, 2025 08:30 EDT    Dictated Utilizing Dragon Dictation: Part of this note may be an electronic transcription/translation of spoken language to printed text using the Dragon Dictation System.

## 2025-07-17 NOTE — PROGRESS NOTES
Assessment/Plan   Neck pain is most consistent with lower cervical radiculopathy related to disc herniation. LBP and BL L>R gluteal pain potentially linked to GTPS vs hip impingement. Can use dry needling on CS but will avoid CS HVLA    CS MRI 2025 - IMPRESSION:  C5/C6: Disc protrusion with moderate right paracentral and foraminal  stenosis.    CS XR 2025 - IMPRESSION:  Cervical spine films demonstrate no presence for acute fracture or  listhesis. Loss of the cervical lordotic curve noted with cervical  spine straightening present. No significant degenerative changes  noted. Postoperative surgical clips seen throughout the anterior neck  soft tissues.    LS XR 2024 - IMPRESSION:  Normal radiograph of the lumbar spine    Visits this year: 3    Subjective    Patient notes neck pain is similar to last visit, constant moderate to severe pain w/ intermittent radiation R UE to hand/fingers. Pain most severe in R shoulder. Did get relief from last visit. But symptoms returned after going on vacation - no specific injury. Seeing spine surgeon soon for consult.     HPI - Neck pain 6/11/25 - Patient reports recent flare up of neck pain radiating down the right arm to the hand. Describes it as painful and numb. Pain has been severe over the past month with significant effect on mental health. Unclear onset of pain but may have been worsened by fall on stone steps. She has tried multiple palliative options for pain over the past month - deep tissue massage, biofeeze, heat/ice, pain medication - most only mildly helpful. She was recently prescribed a steroid taper which has been palliative. The pain is bearable today rated 5/10. She is going for her initial PT evaluation today.  -Initially presented 1/24/25 with chronic neck pain --- recall -- patient reports chronic neck pain and tightness at least as early as 2020 but seems to have been worse since fall 2024 without specific trauma or injury.  Symptoms are localized without  radiation numbness or tingling or headache.  She does have a history of right shoulder labrum tear and repair which was successful however over the past few months the right shoulder has begun bothering her again with limited range of motion.  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  She also endorses occasional radiating or aching dull diffuse pain or numbness and tingling in the right lower extremity and lateral foot.  Previously she had seen a chiropractor with some relief of symptoms although symptoms appear worse now than when she visited the chiropractor years ago.  She works as an ultrasonographer and notes that this may aggravate symptoms that she is sitting in awkward positions but tries to keep things ergonomic.  Pain is present despite position whether she is sitting standing or lying down    Review of Systems   Constitutional:  Negative for fever.   Eyes:  Negative for visual disturbance.   Respiratory:  Negative for shortness of breath.    Cardiovascular:  Negative for chest pain.   Gastrointestinal:  Negative for diarrhea, nausea and vomiting.   Genitourinary:  Negative for difficulty urinating and dysuria.   Skin:  Negative for color change.   Neurological:  Positive for dizziness (Occasional episodes. Previous Dx of POTS).   Psychiatric/Behavioral:  Negative for agitation.    All other systems reviewed and are negative.    Objective   Examination findings (e.g., palpation & ROM):   Decreased C/S and L/S ROM with pain, hypertonic and tender cervical and lumbar erectors, BL upper trapezius & g med  SLR BL 50 with tightness  Pain locally w/ FAIR and ELIAS BL    Segmental joint dysfunction was identified in the following areas using motion  palpation and/or pain provocation assessment:  Cervical:   Thoracic: 5-9  Lumbopelvic: 1, BL SIJ      Physical Exam    Neurological:      General: No focal deficit present.      Mental Status: She is alert.      Sensory: Sensation is intact.      Motor: Weakness present.      Coordination: Coordination is intact.      Gait: Gait is intact.      Deep Tendon Reflexes:      Reflex Scores:       Tricep reflexes are 1+ on the right side and 2+ on the left side.       Bicep reflexes are 2+ on the right side and 2+ on the left side.       Brachioradialis reflexes are 2+ on the right side and 2+ on the left side.       Patellar reflexes are 2+ on the right side and 2+ on the left side.       Achilles reflexes are 2+ on the right side and 2+ on the left side.     Comments: 6/11/25  -belkis: neg   -R triceps 3/5      Plan   Today's treatment:  SMT to regions of segmental dysfunction identified on exam, using age-appropriate force, and manual diversified technique.   STM to patient tolerance to hypertonic paraspinal muscles  Dry needling (10 in, 10 out) to region of the chief complaint / hypertonic muscles identified upon palpation in CS erectors, R upper trapezius  Manual dynamic stretching of the upper trapezius, R periscapular mm  10:21 to 10:36 AM  Patient noted improved mobility and reduced pain post-treatment    Treatment Plan:   The patient and I discussed the risks and benefits of chiropractic care. Based on the patient's subjective complaints along with the examination findings, it is advised that a course of chiropractic treatment be initiated. The patient provided consent for care. The patient tolerated today's treatment with little or no additional discomfort and was instructed to contact the office for questions or concerns. Will see patient once per week then every 2 weeks when symptoms become mild/manageable, further spaced apart contingent upon improvement.     This chart note was generated using dictation  software, and as such, there may be typographical errors present. Abbreviations: Cervical spine (CS), cervical-thoracic (CT), Dry needling (DN), Flexion adduction internal rotation (FAIR), high velocity, low amplitude (HVLA), Lumbar spine (LS), Soft tissue manipulation (STM), spinal manipulative therapy (SMT), Straight leg raise (SLR), Thoracic spine (TS).

## 2025-07-18 ENCOUNTER — APPOINTMENT (OUTPATIENT)
Dept: INTEGRATIVE MEDICINE | Facility: CLINIC | Age: 45
End: 2025-07-18
Payer: COMMERCIAL

## 2025-07-18 DIAGNOSIS — M54.12 CERVICAL RADICULOPATHY: ICD-10-CM

## 2025-07-18 DIAGNOSIS — M54.2 NECK PAIN: ICD-10-CM

## 2025-07-18 DIAGNOSIS — M99.01 CERVICAL SEGMENT DYSFUNCTION: ICD-10-CM

## 2025-07-18 DIAGNOSIS — M54.50 CHRONIC BILATERAL LOW BACK PAIN, UNSPECIFIED WHETHER SCIATICA PRESENT: ICD-10-CM

## 2025-07-18 DIAGNOSIS — M99.03 SOMATIC DYSFUNCTION OF LUMBAR REGION: Primary | ICD-10-CM

## 2025-07-18 DIAGNOSIS — G89.29 CHRONIC BILATERAL LOW BACK PAIN, UNSPECIFIED WHETHER SCIATICA PRESENT: ICD-10-CM

## 2025-07-18 DIAGNOSIS — M99.05 SOMATIC DYSFUNCTION OF PELVIS REGION: ICD-10-CM

## 2025-07-18 DIAGNOSIS — M99.02 SOMATIC DYSFUNCTION OF THORACIC REGION: ICD-10-CM

## 2025-07-18 PROCEDURE — 98941 CHIROPRACT MANJ 3-4 REGIONS: CPT | Performed by: CHIROPRACTOR

## 2025-07-18 PROCEDURE — 97112 NEUROMUSCULAR REEDUCATION: CPT | Performed by: CHIROPRACTOR

## 2025-07-24 ASSESSMENT — ENCOUNTER SYMPTOMS
DYSURIA: 0
FEVER: 0
DIFFICULTY URINATING: 0
NAUSEA: 0
DIZZINESS: 1
SHORTNESS OF BREATH: 0
AGITATION: 0
COLOR CHANGE: 0
DIARRHEA: 0
VOMITING: 0

## 2025-07-24 NOTE — PROGRESS NOTES
Assessment/Plan   Neck pain is most consistent with lower cervical radiculopathy related to disc herniation. LBP and BL L>R gluteal pain potentially linked to GTPS vs hip impingement. Can use dry needling on CS but will avoid CS HVLA    CS MRI 2025 - IMPRESSION:  C5/C6: Disc protrusion with moderate right paracentral and foraminal  stenosis.    CS XR 2025 - IMPRESSION:  Cervical spine films demonstrate no presence for acute fracture or  listhesis. Loss of the cervical lordotic curve noted with cervical  spine straightening present. No significant degenerative changes  noted. Postoperative surgical clips seen throughout the anterior neck  soft tissues.    LS XR 2024 - IMPRESSION:  Normal radiograph of the lumbar spine    Visits this year: 4    Subjective    Patient reports that her cervical spine pain and upper extremity pain is improving although she still has moderate intensity pain in the right shoulder and has difficulty getting comfortable when trying to sleep.  Some relief with getting cervical pillow.  She is doing physical therapy neck stretches and strengthening with exercise bands.  Notes that she got her foot caught in the doorway and walking through and almost fell over but was caught by her  notes that this exacerbated her lower back pain and this happened last weekend.  Has a small bruise on the right ankle    HPI - Neck pain 6/11/25 - Patient reports recent flare up of neck pain radiating down the right arm to the hand. Describes it as painful and numb. Pain has been severe over the past month with significant effect on mental health. Unclear onset of pain but may have been worsened by fall on stone steps. She has tried multiple palliative options for pain over the past month - deep tissue massage, biofeeze, heat/ice, pain medication - most only mildly helpful. She was recently prescribed a steroid taper which has been palliative. The pain is bearable today rated 5/10. She is going for her  initial PT evaluation today.  -Initially presented 1/24/25 with chronic neck pain --- recall -- patient reports chronic neck pain and tightness at least as early as 2020 but seems to have been worse since fall 2024 without specific trauma or injury.  Symptoms are localized without radiation numbness or tingling or headache.  She does have a history of right shoulder labrum tear and repair which was successful however over the past few months the right shoulder has begun bothering her again with limited range of motion.  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  She also endorses occasional radiating or aching dull diffuse pain or numbness and tingling in the right lower extremity and lateral foot.  Previously she had seen a chiropractor with some relief of symptoms although symptoms appear worse now than when she visited the chiropractor years ago.  She works as an ultrasonographer and notes that this may aggravate symptoms that she is sitting in awkward positions but tries to keep things ergonomic.  Pain is present despite position whether she is sitting standing or lying down    Review of Systems   Constitutional:  Negative for fever.   Eyes:  Negative for visual disturbance.   Respiratory:  Negative for shortness of breath.    Cardiovascular:  Negative for chest pain.   Gastrointestinal:  Negative for diarrhea, nausea and vomiting.   Genitourinary:  Negative for difficulty urinating and dysuria.   Skin:  Negative for color change.   Neurological:  Positive for dizziness (Occasional episodes. Previous Dx of POTS).   Psychiatric/Behavioral:  Negative for agitation.    All other systems reviewed and are negative.    Objective   Examination findings (e.g.,  palpation & ROM):   Decreased C/S and L/S ROM with pain, hypertonic and tender cervical and lumbar erectors, BL upper trapezius & g med  SLR BL 50 with tightness  Pain locally w/ FAIR and ELIAS BL    Segmental joint dysfunction was identified in the following areas using motion palpation and/or pain provocation assessment:  Cervical:   Thoracic: 5-9  Lumbopelvic: 1, BL SIJ      Physical Exam    Neurological:      General: No focal deficit present.      Mental Status: She is alert.      Sensory: Sensation is intact.      Motor: Weakness present.      Coordination: Coordination is intact.      Gait: Gait is intact.      Deep Tendon Reflexes:      Reflex Scores:       Tricep reflexes are 2+ on the right side and 2+ on the left side.       Bicep reflexes are 2+ on the right side and 2+ on the left side.       Brachioradialis reflexes are 2+ on the right side and 2+ on the left side.       Patellar reflexes are 2+ on the right side and 2+ on the left side.       Achilles reflexes are 2+ on the right side and 2+ on the left side.     Comments: 7/25/25  -belkis: neg   -R triceps 4/5      Plan   Today's treatment:  SMT to regions of segmental dysfunction identified on exam, using age-appropriate force, and manual diversified technique.   STM to patient tolerance to hypertonic paraspinal muscles  Dry needling (10 in, 10 out) to region of the chief complaint / hypertonic muscles identified upon palpation in CS erectors, R upper trapezius  Manual dynamic stretching of the upper trapezius, R periscapular mm, BL glute/hamstrings  10:21 to 10:36 AM  Patient noted improved mobility and reduced pain post-treatment    Treatment Plan:   The patient and I discussed the risks and benefits of chiropractic care. Based on the patient's subjective complaints along with the examination findings, it is advised that a course of chiropractic treatment be initiated. The patient provided consent for care. The patient tolerated today's  treatment with little or no additional discomfort and was instructed to contact the office for questions or concerns. Will see patient once per week then every 2 weeks when symptoms become mild/manageable, further spaced apart contingent upon improvement.     This chart note was generated using dictation software, and as such, there may be typographical errors present. Abbreviations: Cervical spine (CS), cervical-thoracic (CT), Dry needling (DN), Flexion adduction internal rotation (FAIR), high velocity, low amplitude (HVLA), Lumbar spine (LS), Soft tissue manipulation (STM), spinal manipulative therapy (SMT), Straight leg raise (SLR), Thoracic spine (TS).

## 2025-07-25 ENCOUNTER — APPOINTMENT (OUTPATIENT)
Dept: INTEGRATIVE MEDICINE | Facility: CLINIC | Age: 45
End: 2025-07-25
Payer: COMMERCIAL

## 2025-07-25 DIAGNOSIS — M54.2 NECK PAIN: ICD-10-CM

## 2025-07-25 DIAGNOSIS — M99.02 SOMATIC DYSFUNCTION OF THORACIC REGION: ICD-10-CM

## 2025-07-25 DIAGNOSIS — M99.03 SOMATIC DYSFUNCTION OF LUMBAR REGION: Primary | ICD-10-CM

## 2025-07-25 DIAGNOSIS — M99.01 CERVICAL SEGMENT DYSFUNCTION: ICD-10-CM

## 2025-07-25 DIAGNOSIS — M54.12 CERVICAL RADICULOPATHY: ICD-10-CM

## 2025-07-25 DIAGNOSIS — M99.05 SOMATIC DYSFUNCTION OF PELVIS REGION: ICD-10-CM

## 2025-07-25 DIAGNOSIS — M79.10 MYALGIA: ICD-10-CM

## 2025-07-25 PROCEDURE — 97112 NEUROMUSCULAR REEDUCATION: CPT | Performed by: CHIROPRACTOR

## 2025-07-25 PROCEDURE — 98941 CHIROPRACT MANJ 3-4 REGIONS: CPT | Performed by: CHIROPRACTOR

## 2025-08-08 ENCOUNTER — APPOINTMENT (OUTPATIENT)
Dept: ORTHOPEDIC SURGERY | Facility: CLINIC | Age: 45
End: 2025-08-08
Payer: COMMERCIAL

## 2025-08-08 ENCOUNTER — APPOINTMENT (OUTPATIENT)
Dept: INTEGRATIVE MEDICINE | Facility: CLINIC | Age: 45
End: 2025-08-08
Payer: COMMERCIAL

## 2025-08-13 ASSESSMENT — ENCOUNTER SYMPTOMS
FEVER: 0
VOMITING: 0
NAUSEA: 0
AGITATION: 0
DIARRHEA: 0
DYSURIA: 0
DIFFICULTY URINATING: 0
COLOR CHANGE: 0
DIZZINESS: 1
SHORTNESS OF BREATH: 0

## 2025-08-14 ENCOUNTER — ALLIED HEALTH (OUTPATIENT)
Dept: INTEGRATIVE MEDICINE | Facility: CLINIC | Age: 45
End: 2025-08-14
Payer: COMMERCIAL

## 2025-08-14 ENCOUNTER — APPOINTMENT (OUTPATIENT)
Dept: INTEGRATIVE MEDICINE | Facility: CLINIC | Age: 45
End: 2025-08-14
Payer: COMMERCIAL

## 2025-08-14 ENCOUNTER — OFFICE VISIT (OUTPATIENT)
Dept: ORTHOPEDIC SURGERY | Facility: CLINIC | Age: 45
End: 2025-08-14
Payer: COMMERCIAL

## 2025-08-14 DIAGNOSIS — M54.12 CERVICAL RADICULOPATHY: ICD-10-CM

## 2025-08-14 DIAGNOSIS — M25.551 RIGHT HIP PAIN: ICD-10-CM

## 2025-08-14 DIAGNOSIS — M79.10 MYALGIA: ICD-10-CM

## 2025-08-14 DIAGNOSIS — M99.01 CERVICAL SEGMENT DYSFUNCTION: ICD-10-CM

## 2025-08-14 DIAGNOSIS — M54.2 NECK PAIN: ICD-10-CM

## 2025-08-14 DIAGNOSIS — M99.03 SOMATIC DYSFUNCTION OF LUMBAR REGION: Primary | ICD-10-CM

## 2025-08-14 DIAGNOSIS — M99.05 SOMATIC DYSFUNCTION OF PELVIS REGION: ICD-10-CM

## 2025-08-14 DIAGNOSIS — M54.12 CERVICAL RADICULOPATHY: Primary | ICD-10-CM

## 2025-08-14 DIAGNOSIS — M99.02 SOMATIC DYSFUNCTION OF THORACIC REGION: ICD-10-CM

## 2025-08-14 DIAGNOSIS — Z98.890 HISTORY OF THYROID SURGERY: ICD-10-CM

## 2025-08-14 PROCEDURE — 98941 CHIROPRACT MANJ 3-4 REGIONS: CPT

## 2025-08-14 PROCEDURE — 97112 NEUROMUSCULAR REEDUCATION: CPT

## 2025-08-14 PROCEDURE — 99214 OFFICE O/P EST MOD 30 MIN: CPT | Performed by: ORTHOPAEDIC SURGERY

## 2025-08-14 PROCEDURE — 99213 OFFICE O/P EST LOW 20 MIN: CPT | Performed by: ORTHOPAEDIC SURGERY

## 2025-08-15 ENCOUNTER — TELEPHONE (OUTPATIENT)
Dept: ORTHOPEDIC SURGERY | Facility: CLINIC | Age: 45
End: 2025-08-15
Payer: COMMERCIAL

## 2025-08-15 DIAGNOSIS — M54.12 CERVICAL RADICULOPATHY: ICD-10-CM

## 2025-09-15 ENCOUNTER — APPOINTMENT (OUTPATIENT)
Dept: OTOLARYNGOLOGY | Facility: CLINIC | Age: 45
End: 2025-09-15
Payer: COMMERCIAL

## 2025-09-15 ENCOUNTER — APPOINTMENT (OUTPATIENT)
Dept: PRIMARY CARE | Facility: CLINIC | Age: 45
End: 2025-09-15
Payer: COMMERCIAL

## 2025-09-22 ENCOUNTER — APPOINTMENT (OUTPATIENT)
Dept: INTEGRATIVE MEDICINE | Facility: CLINIC | Age: 45
End: 2025-09-22
Payer: COMMERCIAL

## (undated) DEVICE — STAY SET, SURGICAL , 5MM SHARP HOOK, CS/ 50

## (undated) DEVICE — SPONGE, LAP, XRAY DECT, 18IN X 18IN, W/MASTER DMT, STERILE

## (undated) DEVICE — PREP TRAY, VAGINAL

## (undated) DEVICE — SPONGE, HEMOSTAT, SURGICEL FIBRILLAR, ABS, 4 X 4, LF

## (undated) DEVICE — STRIP, SKIN CLOSURE, STERI STRIP, REINFORCED, 0.5 X 4 IN

## (undated) DEVICE — TIP, SUCTION, YANKAUER, BULB, ADULT

## (undated) DEVICE — SPONGE, GAUZE, XRAY DECT, 16 PLY, 4 X 4, W/MASTER DMT,STERILE

## (undated) DEVICE — ELECTRODE, PAIRED SUBDERMAL OTO

## (undated) DEVICE — Device

## (undated) DEVICE — DRAIN, WOUND, FLAT, HUBLESS, FULL LENGTH PERFORATION, 10 MM X 20 CM, SILICONE

## (undated) DEVICE — TOWEL PACK, STERILE, 4/PACK, BLUE

## (undated) DEVICE — EVACUATOR, WOUND, SUCTION, CLOSED, JACKSON-PRATT, 100 CC, SILICONE

## (undated) DEVICE — DRAPE, PAD, INSTRUMENT, MAGNETIC, MEDIUM, 10 X 16 IN, DISPOSABLE

## (undated) DEVICE — DRAPE, INSTRUMENT, W/POUCH, STERI DRAPE, 7 X 11 IN, DISPOSABLE, STERILE

## (undated) DEVICE — CORD, CAUTERY, BIOPOLAR FORCEP, 12FT

## (undated) DEVICE — SUTURE, SILK, 2-0, TIES, 12-30 IN, BLACK

## (undated) DEVICE — NEEDLE, ELECTRODE, ELECTROSURGICAL, INSULATED

## (undated) DEVICE — SPONGE, DISSECTOR, PEANUT, 3/8, STERILE 5 FOAM HOLDER"

## (undated) DEVICE — ADHESIVE, SKIN, MASTISOL, 2/3 CC VIAL

## (undated) DEVICE — SOLUTION, IRRIGATION, STERILE WATER, 1000 ML, POUR BOTTLE

## (undated) DEVICE — SUTURE, ETHILON, 3-0, 18 IN, PS2, BLACK

## (undated) DEVICE — SUTURE, MONOCRYL, 4-0, 27 IN, PS-2, UNDYED

## (undated) DEVICE — APPLICATOR, CHLORAPREP, W/ORANGE TINT, 26ML

## (undated) DEVICE — SOLUTION, IRRIGATION, SODIUM CHLORIDE 0.9%, 1000 ML, POUR BOTTLE

## (undated) DEVICE — CONTAINER, SPECIMEN, 4 OZ, OR PEEL PACK, STERILE

## (undated) DEVICE — TUBE, EMG ENDOTRACHEAL SZ 7 TRIVANTAGE

## (undated) DEVICE — PROBE, STIMULATOR, MONOPOLAR, FLUSH TIP, PRASS, W/HANDLE, STANDARD

## (undated) DEVICE — SUTURE, VICRYL, 3-0,18 IN, SH, UNDYED

## (undated) DEVICE — CLIP, LIGATING, W/ADHESIVE PAD, MEDIUM, TITANIUM

## (undated) DEVICE — TUBING, SUCTION, CONNECTING, 9/32 X 10FT, LF

## (undated) DEVICE — CLIP, LIGATING, W/ADHESIVE, WIDE SLOT, SMALL, TITANIUM

## (undated) DEVICE — STAPLER, SKIN, PLUS, WIDE, 35

## (undated) DEVICE — GLOVE, SURGICAL, PROTEXIS PI , 7.5, PF, LF

## (undated) DEVICE — SHEARS, CURVED 9CM HARMONIC FOCUS

## (undated) DEVICE — SUTURE, VICRYL, 3-0, 27 IN, SH